# Patient Record
Sex: MALE | Race: WHITE | NOT HISPANIC OR LATINO | URBAN - METROPOLITAN AREA
[De-identification: names, ages, dates, MRNs, and addresses within clinical notes are randomized per-mention and may not be internally consistent; named-entity substitution may affect disease eponyms.]

---

## 2021-01-27 ENCOUNTER — INPATIENT (INPATIENT)
Facility: HOSPITAL | Age: 81
LOS: 12 days | Discharge: HOSPICE | DRG: 177 | End: 2021-02-09
Attending: HOSPITALIST | Admitting: STUDENT IN AN ORGANIZED HEALTH CARE EDUCATION/TRAINING PROGRAM
Payer: MEDICARE

## 2021-01-27 VITALS
TEMPERATURE: 98 F | OXYGEN SATURATION: 96 % | DIASTOLIC BLOOD PRESSURE: 76 MMHG | HEART RATE: 64 BPM | RESPIRATION RATE: 17 BRPM | SYSTOLIC BLOOD PRESSURE: 131 MMHG

## 2021-01-27 LAB
ALBUMIN SERPL ELPH-MCNC: 3.4 G/DL — SIGNIFICANT CHANGE UP (ref 3.3–5)
ALP SERPL-CCNC: 67 U/L — SIGNIFICANT CHANGE UP (ref 40–120)
ALT FLD-CCNC: 20 U/L — SIGNIFICANT CHANGE UP (ref 10–45)
ANION GAP SERPL CALC-SCNC: 12 MMOL/L — SIGNIFICANT CHANGE UP (ref 5–17)
ANISOCYTOSIS BLD QL: SLIGHT — SIGNIFICANT CHANGE UP
AST SERPL-CCNC: 25 U/L — SIGNIFICANT CHANGE UP (ref 10–40)
BASOPHILS # BLD AUTO: 0 K/UL — SIGNIFICANT CHANGE UP (ref 0–0.2)
BASOPHILS NFR BLD AUTO: 0 % — SIGNIFICANT CHANGE UP (ref 0–2)
BILIRUB SERPL-MCNC: 0.3 MG/DL — SIGNIFICANT CHANGE UP (ref 0.2–1.2)
BUN SERPL-MCNC: 22 MG/DL — SIGNIFICANT CHANGE UP (ref 7–23)
BURR CELLS BLD QL SMEAR: PRESENT — SIGNIFICANT CHANGE UP
CALCIUM SERPL-MCNC: 8.6 MG/DL — SIGNIFICANT CHANGE UP (ref 8.4–10.5)
CHLORIDE SERPL-SCNC: 103 MMOL/L — SIGNIFICANT CHANGE UP (ref 96–108)
CO2 SERPL-SCNC: 27 MMOL/L — SIGNIFICANT CHANGE UP (ref 22–31)
CREAT SERPL-MCNC: 0.72 MG/DL — SIGNIFICANT CHANGE UP (ref 0.5–1.3)
EOSINOPHIL # BLD AUTO: 0 K/UL — SIGNIFICANT CHANGE UP (ref 0–0.5)
EOSINOPHIL NFR BLD AUTO: 0 % — SIGNIFICANT CHANGE UP (ref 0–6)
GIANT PLATELETS BLD QL SMEAR: PRESENT — SIGNIFICANT CHANGE UP
GLUCOSE SERPL-MCNC: 103 MG/DL — HIGH (ref 70–99)
HCT VFR BLD CALC: 41.4 % — SIGNIFICANT CHANGE UP (ref 39–50)
HGB BLD-MCNC: 13.2 G/DL — SIGNIFICANT CHANGE UP (ref 13–17)
LYMPHOCYTES # BLD AUTO: 0.2 K/UL — LOW (ref 1–3.3)
LYMPHOCYTES # BLD AUTO: 4.4 % — LOW (ref 13–44)
MANUAL SMEAR VERIFICATION: SIGNIFICANT CHANGE UP
MCHC RBC-ENTMCNC: 28.9 PG — SIGNIFICANT CHANGE UP (ref 27–34)
MCHC RBC-ENTMCNC: 31.9 GM/DL — LOW (ref 32–36)
MCV RBC AUTO: 90.6 FL — SIGNIFICANT CHANGE UP (ref 80–100)
METAMYELOCYTES # FLD: 1.8 % — HIGH (ref 0–0)
MONOCYTES # BLD AUTO: 0.78 K/UL — SIGNIFICANT CHANGE UP (ref 0–0.9)
MONOCYTES NFR BLD AUTO: 17.5 % — HIGH (ref 2–14)
NEUTROPHILS # BLD AUTO: 3.12 K/UL — SIGNIFICANT CHANGE UP (ref 1.8–7.4)
NEUTROPHILS NFR BLD AUTO: 65.8 % — SIGNIFICANT CHANGE UP (ref 43–77)
NEUTS BAND # BLD: 4.4 % — SIGNIFICANT CHANGE UP (ref 0–8)
OVALOCYTES BLD QL SMEAR: SIGNIFICANT CHANGE UP
PLAT MORPH BLD: ABNORMAL
PLATELET # BLD AUTO: 120 K/UL — LOW (ref 150–400)
POIKILOCYTOSIS BLD QL AUTO: SLIGHT — SIGNIFICANT CHANGE UP
POTASSIUM SERPL-MCNC: 3.8 MMOL/L — SIGNIFICANT CHANGE UP (ref 3.5–5.3)
POTASSIUM SERPL-SCNC: 3.8 MMOL/L — SIGNIFICANT CHANGE UP (ref 3.5–5.3)
PROT SERPL-MCNC: 6.3 G/DL — SIGNIFICANT CHANGE UP (ref 6–8.3)
RBC # BLD: 4.57 M/UL — SIGNIFICANT CHANGE UP (ref 4.2–5.8)
RBC # FLD: 14.3 % — SIGNIFICANT CHANGE UP (ref 10.3–14.5)
RBC BLD AUTO: ABNORMAL
SARS-COV-2 RNA SPEC QL NAA+PROBE: DETECTED
SMUDGE CELLS # BLD: PRESENT — SIGNIFICANT CHANGE UP
SODIUM SERPL-SCNC: 142 MMOL/L — SIGNIFICANT CHANGE UP (ref 135–145)
VARIANT LYMPHS # BLD: 6.1 % — HIGH (ref 0–6)
WBC # BLD: 4.45 K/UL — SIGNIFICANT CHANGE UP (ref 3.8–10.5)
WBC # FLD AUTO: 4.45 K/UL — SIGNIFICANT CHANGE UP (ref 3.8–10.5)

## 2021-01-27 PROCEDURE — 71260 CT THORAX DX C+: CPT | Mod: 26,MG

## 2021-01-27 PROCEDURE — 99223 1ST HOSP IP/OBS HIGH 75: CPT | Mod: GC

## 2021-01-27 PROCEDURE — 74177 CT ABD & PELVIS W/CONTRAST: CPT | Mod: 26,ME

## 2021-01-27 PROCEDURE — G1004: CPT

## 2021-01-27 PROCEDURE — 99285 EMERGENCY DEPT VISIT HI MDM: CPT | Mod: CS

## 2021-01-27 PROCEDURE — 70450 CT HEAD/BRAIN W/O DYE: CPT | Mod: 26,MA

## 2021-01-27 PROCEDURE — 71045 X-RAY EXAM CHEST 1 VIEW: CPT | Mod: 26

## 2021-01-27 RX ORDER — CEFTRIAXONE 500 MG/1
1000 INJECTION, POWDER, FOR SOLUTION INTRAMUSCULAR; INTRAVENOUS ONCE
Refills: 0 | Status: COMPLETED | OUTPATIENT
Start: 2021-01-27 | End: 2021-01-27

## 2021-01-27 RX ORDER — HALOPERIDOL DECANOATE 100 MG/ML
2 INJECTION INTRAMUSCULAR ONCE
Refills: 0 | Status: DISCONTINUED | OUTPATIENT
Start: 2021-01-27 | End: 2021-01-27

## 2021-01-27 RX ORDER — VANCOMYCIN HCL 1 G
1500 VIAL (EA) INTRAVENOUS ONCE
Refills: 0 | Status: COMPLETED | OUTPATIENT
Start: 2021-01-27 | End: 2021-01-27

## 2021-01-27 RX ORDER — SODIUM CHLORIDE 9 MG/ML
500 INJECTION INTRAMUSCULAR; INTRAVENOUS; SUBCUTANEOUS ONCE
Refills: 0 | Status: COMPLETED | OUTPATIENT
Start: 2021-01-27 | End: 2021-01-27

## 2021-01-27 RX ORDER — GLYCERIN ADULT
2 SUPPOSITORY, RECTAL RECTAL ONCE
Refills: 0 | Status: COMPLETED | OUTPATIENT
Start: 2021-01-27 | End: 2021-01-27

## 2021-01-27 RX ORDER — METRONIDAZOLE 500 MG
500 TABLET ORAL ONCE
Refills: 0 | Status: COMPLETED | OUTPATIENT
Start: 2021-01-27 | End: 2021-01-27

## 2021-01-27 RX ORDER — HALOPERIDOL DECANOATE 100 MG/ML
2 INJECTION INTRAMUSCULAR ONCE
Refills: 0 | Status: COMPLETED | OUTPATIENT
Start: 2021-01-27 | End: 2021-01-27

## 2021-01-27 RX ADMIN — Medication 1 MILLIGRAM(S): at 17:05

## 2021-01-27 RX ADMIN — Medication 100 MILLIGRAM(S): at 19:45

## 2021-01-27 RX ADMIN — Medication 1 ENEMA: at 20:52

## 2021-01-27 RX ADMIN — Medication 300 MILLIGRAM(S): at 20:53

## 2021-01-27 RX ADMIN — CEFTRIAXONE 100 MILLIGRAM(S): 500 INJECTION, POWDER, FOR SOLUTION INTRAMUSCULAR; INTRAVENOUS at 19:17

## 2021-01-27 RX ADMIN — Medication 2 SUPPOSITORY(S): at 20:53

## 2021-01-27 RX ADMIN — SODIUM CHLORIDE 1000 MILLILITER(S): 9 INJECTION INTRAMUSCULAR; INTRAVENOUS; SUBCUTANEOUS at 14:30

## 2021-01-27 NOTE — H&P ADULT - HISTORY OF PRESENT ILLNESS
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 Per aide upon arrival to ED, the patient has been groaning more frequently and points to his abdomen. Last BM was on 1/26th and no blood noted in BMs.     ED Vitals: T 98.1F, /76, HR 64, RR 17, SpO2 96% RA  ED Labs: WBC 4.45, Hb 13.2, plt 120, Na 142, K+ 3.8, BUN 22, sCr 0.72, glucose 103, COVID-19 PCR positive   ED Imaging: CXR shwoing L basilar opacity. CTH noncontrast: no acute pathologies, generalized volume loss. CT chest/abd/pelvis: +Bibasilar lower lobe consolidations likely aspiration PNA, subtle GGO in RML. +Large rectal stool burden with probable stercoral proctitis  ED Management: IV CTX 1g x1, IV flagyl 500mg x1, IV vanc 1500mg x1. 500cc NS bolus x1. IM haldol 2mg x1, IV ativan 1mg x1. Fleet enema x1. Manual disimpaction by surgery team

## 2021-01-27 NOTE — ED PROVIDER NOTE - OBJECTIVE STATEMENT
79 yo hx of dementia w upper  baack pain and abd pain for the last week. hx per aide, but limited to that information. no known vomitng, GI bleed sx, f/c. pt non verbal. states mental status at  baseline. DNR/DNI per paperwork. 79 yo hx of dementia w upper back pain and abd pain for the last week. aide states pt has been groaning more often, pointing to abdomen. hx per aide, but limited to that information. no known vomiting, GI bleed sx, f/c. pt non verbal. states mental status at baseline. DNR/DNI per paperwork. has not had BM in a few days per aide.

## 2021-01-27 NOTE — ED ADULT NURSE NOTE - CHIEF COMPLAINT QUOTE
hollis from [address] [place] [address].  Per ems patient was c/o abdominal pain this morning and upper back pain from fall last week.  Hx dementia.  Patient is nonverbal at baseline per HHA.  Bruising to back.

## 2021-01-27 NOTE — H&P ADULT - NSHPSOCIALHISTORY_GEN_ALL_CORE
Patient lives at nursing home. Patient lives at nursing home. Unable to assess further due to patient's cognitive impairment

## 2021-01-27 NOTE — ED PROVIDER NOTE - CLINICAL SUMMARY MEDICAL DECISION MAKING FREE TEXT BOX
Pt w dementia wupper back pain and abd pain  - given limited hx, labs, CT, reassess. Pt w dementia w upper back pain and abd pain, given limited hx and complaints, labs, CT c/a/p completed. unclear if mental status change ?chronic/acute- head CT added. +infiltrates concerning for aspiration, stercoral proctitis, broad spectrum abx initiated, surg consulted w covid positive later resulted. Disc with assisted living facility, due to covid +, unable to take pt back.  glycerin suppository/enema given for constipation w result of 2 medium stools reported by nurse. Pt w dementia w upper back pain and abd pain, given limited hx and complaints, labs, CT c/a/p completed. unclear if mental status change ?chronic/acute- head CT added.  pt not tolerating initial CTs,  becoming agitated on table, sedatives given and rpt CT obtained. +infiltrates concerning for aspiration, stercoral proctitis, broad spectrum abx initiated, surg consulted w covid positive later resulted. Disc with assisted living facility, due to covid +, unable to take pt back.  glycerin suppository/enema given for constipation w result of 2 medium stools reported by nurse.

## 2021-01-27 NOTE — H&P ADULT - PROBLEM SELECTOR PLAN 1
Pt presents with 1 week of abdominal and lower back pain. Found to have stool impaction, s/p disimpaction by surgery in the ED. No acute surgical intervention and no abx recommended by surgery at this time.   - f/u surgery recs  - c/w bowel regimen with miralax and senna  - s/p IV CTX 1g x1, IV flagyl 500mg x1, IV vanc 1500mg x1 in the ED  - pt afebrile, no leukocytosis and no signs of active infection  - will hold off on further antibiotics at this time

## 2021-01-27 NOTE — H&P ADULT - PROBLEM SELECTOR PLAN 2
Pt incidentally found to have COVID-19 PCR+ on presentation. No need for supplemental oxygen at this time. CT chest showing subtle GGO in RML on CT chest.   - trend daily CRP, ferritin, d-dimer  - DVT ppx with lovenox  - pending ROSAS

## 2021-01-27 NOTE — ED ADULT NURSE NOTE - OBJECTIVE STATEMENT
Patent BIBA from assisted living for intermitent abdominal pain for 1 week , Patent  confused BIBA from assisted living for intermitent abdominal pain for 1 week , s/p fall 1 week ago , HHA noticed bruise on Left mid back . Unknown loc . Pt. had poor appetite today but no pain at this time , no nausea , vomiting , fever nor chills . Safety maintained ./

## 2021-01-27 NOTE — ED ADULT NURSE NOTE - PMH
Alzheimer disease    Bipolar 1 disorder    Dementia    Insomnia    Schizophrenia, acute undifferentiated

## 2021-01-27 NOTE — ED ADULT TRIAGE NOTE - CHIEF COMPLAINT QUOTE
hollis from [address] [place] [address].  Per ems patient was c/o abdominal pain this morning and upper back pain from fall last week.  Hx dementia.  Patient is nonverbal at baseline per HHA.  Bruising to back.

## 2021-01-27 NOTE — H&P ADULT - PROBLEM SELECTOR PLAN 4
Hx of bipolar and schizophrenia. Takes seroquel 25mg prn   - s/p IM haldol 2mg x1, IV ativan 1mg x1  - seroquel 25mg prn for agitation  - f/u AM official med/rec  - enhanced supervision

## 2021-01-27 NOTE — H&P ADULT - NSHPLABSRESULTS_GEN_ALL_CORE
13.2   4.45  )-----------( 120      ( 27 Jan 2021 14:31 )             41.4     01-27    142  |  103  |  22  ----------------------------<  103<H>  3.8   |  27  |  0.72    Ca    8.6      27 Jan 2021 14:31    TPro  6.3  /  Alb  3.4  /  TBili  0.3  /  DBili  x   /  AST  25  /  ALT  20  /  AlkPhos  67  01-27          RADIOLOGY & ADDITIONAL TESTS:

## 2021-01-27 NOTE — H&P ADULT - PROBLEM SELECTOR PLAN 8
Plan:   F: none  E: replete K<4, Mg<2  N: Regular diet  VTE Prophylaxis: lovenox  C: DNR/DNI  D: RMF

## 2021-01-27 NOTE — CONSULT NOTE ADULT - ATTENDING COMMENTS
No complaints, but not particularly oriented.  Abd soft, ND NT  AARON no masses, some solid stool in vault. generalized tenderness    Imp: Fecal impaction.  Stercoral proctitis is minimal and not a clinical issue.  Rec: Bowel regimen. May need enema if tolerates it. No complaints, but not particularly oriented.  Abd soft, ND NT  AARON no masses, some solid stool in vault. generalized tenderness    Imp: Fecal impaction.  Stercoral proctitis is minimal and not a clinical issue.  Rec: Bowel regimen. May need enema if tolerates it.    Reconsult prn

## 2021-01-27 NOTE — ED PROVIDER NOTE - CARE PLAN
Principal Discharge DX:	Abdominal pain   Principal Discharge DX:	Constipation  Secondary Diagnosis:	Proctitis  Secondary Diagnosis:	COVID-19  Secondary Diagnosis:	Aspiration pneumonia

## 2021-01-27 NOTE — H&P ADULT - ASSESSMENT
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 Also found to be COVID+

## 2021-01-27 NOTE — ED PROVIDER NOTE - NS ED MD DISPO SPECIAL CONSIDERATION1
Denies bleeding issues.     INR (no units)   Date Value   10/31/2019 2.2   10/07/2019 1.4     INR-POC (no units)   Date Value   10/31/2019 2.2          Confirmed COVID-19

## 2021-01-27 NOTE — CONSULT NOTE ADULT - SUBJECTIVE AND OBJECTIVE BOX
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 No reports of blood in BM, and per nursing staff, his last BM was on the 26th.     General surgery consulted for evaluation of stercoral colitis as seen on CT scan.      PAST MEDICAL & SURGICAL HISTORY:  Schizophrenia, acute undifferentiated    Bipolar 1 disorder    Insomnia    Dementia    Alzheimer disease    No significant past surgical history      Home Meds: Home Medications:  Melatonin 3 mg oral tablet: 1 tab(s) orally once a day (at bedtime) (27 Jan 2021 12:58)  SEROquel 25 mg oral tablet: 1 tab(s) orally once a day, As Needed (27 Jan 2021 12:58)    Allergies: Allergies    No Known Allergies    Intolerances      Soc:   Advanced Directives: Presumed Full Code     CURRENT MEDICATIONS:   --------------------------------------------------------------------------------------  Neurologic Medications    Respiratory Medications    Cardiovascular Medications    Gastrointestinal Medications  glycerin Suppository - Adult 2 Suppository(s) Rectal Once  saline laxative (FLEET) Rectal Enema 1 Enema Rectal Once    Genitourinary Medications    Hematologic/Oncologic Medications    Antimicrobial/Immunologic Medications  vancomycin  IVPB. 1500 milliGRAM(s) IV Intermittent once    Endocrine/Metabolic Medications    Topical/Other Medications    --------------------------------------------------------------------------------------    VITAL SIGNS, INS/OUTS (last 24 hours):  --------------------------------------------------------------------------------------  ICU Vital Signs Last 24 Hrs  T(C): 37.1 (27 Jan 2021 17:16), Max: 37.1 (27 Jan 2021 17:16)  T(F): 98.8 (27 Jan 2021 17:16), Max: 98.8 (27 Jan 2021 17:16)  HR: 74 (27 Jan 2021 17:16) (64 - 74)  BP: 137/76 (27 Jan 2021 17:16) (131/76 - 137/76)  BP(mean): --  ABP: --  ABP(mean): --  RR: 17 (27 Jan 2021 17:16) (17 - 17)  SpO2: 96% (27 Jan 2021 17:16) (96% - 96%)    I&O's Summary    --------------------------------------------------------------------------------------    EXAM:  General: NAD, combative  Neuro: demented, doesn't answer questions  HEENT: NC/AT, EOMI, MMM  Resp: non-labored breathing on RA  CV: palpable distal pulses  Abdomen: soft, non-distended, non-tender  Rectal: no external hemorrhoids, hard stool evacuated from rectal vault, no blood seen  Extremities: warm, non-edematous, ROM intact        LABS  --------------------------------------------------------------------------------------  Labs:  CAPILLARY BLOOD GLUCOSE                              13.2   4.45  )-----------( 120      ( 27 Jan 2021 14:31 )             41.4       Auto Neutrophil %: 65.8 % (01-27-21 @ 14:31)  Band Neutrophils %: 4.4 % (01-27-21 @ 14:31)    01-27    142  |  103  |  22  ----------------------------<  103<H>  3.8   |  27  |  0.72      Calcium, Total Serum: 8.6 mg/dL (01-27-21 @ 14:31)      LFTs:             6.3  | 0.3  | 25       ------------------[67      ( 27 Jan 2021 14:31 )  3.4  | x    | 20          Lipase:x      Amylase:x             Coags:                  --------------------------------------------------------------------------------------    OTHER LABS    IMAGING RESULTS  EXAM: CT CHEST IC    EXAM: CT ABDOMEN AND PELVIS IC    PROCEDURE DATE: 01/27/2021        INTERPRETATION: CLINICAL INFORMATION: Abdominal pain, shortness of breath    COMPARISON: None.    PROCEDURE:    CT of the Chest, abdomen and pelvis was performed with intravenous contrast.  90 ml of Omnipaque 350 was injected intravenously. 10 ml were discarded. No oral contrast was given.  Sagittal and coronal reformats were performed, as well as maximum intensity projections (MIPs) of the chest.      FINDINGS:  Motion degraded study.    Lungs and airways: Bilateral posterior basal and lateral basal segment consolidation, left greater than the right. Subtle groundglass opacity in the lateral right middle lobe (5:94). Central airways are patent.  Pleura: No pleural effusion  Mediastinum And Vidhya: Patulous intrathoracic esophagus without air-fluid level. Few small mediastinal and right hilar nodes, likely reactive.  Heart: Within normal limits  Vessels: Within normal limits  Chest wall: Within normal limits.    Liver: Normal in size and morphology. 2.6 cm hypodensity in the right hepatic lobe likely hepatic cyst. Faint hypodensity in the left hepatic lobe (3:75 and hepatic segment 6 (3:85), too small to characterize. The main portal vein is patent.  Gallbladder and biliary ducts: No gallstones. No intra/extrahepatic biliary ductal dilatation.  Spleen: Within normal limits.  Pancreas: Fatty atrophy of pancreatic head. No ductal dilatation..  Adrenals: Within normal limits.  Kidneys/ureters: No hydronephrosis. No urinary calculi. Cortical scarring in the posterior aspect of the right kidney.    Bladder: Within normal limits.  Reproductive organs: Status post prostatectomy, without definite pelvic mass.    Bowel: Large stool burden in the rectum with mild rectal wall thickening and presacral fat stranding. Scattered colonic diverticulosis.  Peritoneum/retroperitoneum: Focal peritoneal fat stranding in the right upper abdomen adjacent to the hepatic flexure (3:102), of unclear etiology. Postsurgical clips in the pelvis.  Vasculature: The aorta and its branches are normal in caliber.  Lymph nodes: Lymph nodes are not enlarged.    Bones and soft tissue: Degenerative changes in the spine..      IMPRESSION:    Motion degraded study.  1. Bilateral lower lobe consolidations, likely aspiration pneumonia.  2. Subtle area of groundglass opacity in the right middle lobe, likely infectious/inflammatory.  3. Subtle focal fat stranding in the right upper quadrant adjacent to hepatic flexure, may be artifactual versus adjacent small diverticulitis. However evaluation is limited.  4. Large rectal stool burden with probable stercoral proctitis.          ASSESSMENT/ PLAN:    Patient is a 81 y/o M with dementia with Lewy bodies, and no known PSH (patient doesn't answer questions), presents from his nursing home to the ED with reported abdominal and back pain for 1 week. No reports of blood in BM, and per nursing staff, his last BM was on the 26th. He is afebrile and HDS, with benign abdominal exam, and hard stool evacuated from rectal vault, with no blood seen. CT findings suggestive of stercoral colitis. He is COVID positive.    - No surgical intervention  - Continue bowel regimen  - No need for abx from our perspective  - Please reconsult as needed      Attending aware and agrees with plan

## 2021-01-27 NOTE — H&P ADULT - PROBLEM SELECTOR PLAN 6
Hx of dementia with lewy bodies. Nonverbal at baseline. Not on any other known medications for dementia  - f/u AM official med/rec  - enhanced supervision

## 2021-01-27 NOTE — H&P ADULT - ATTENDING COMMENTS
Seen by General Surgery and disimpacted in the ED.     [ ] Stercoral Colitis  -	C/w Bowel Regimen    -	No indication for antibiotics     [ ] C/f Aspiration PNA  -	Consolidation on CT c/f aspiration. No WBC elevation, no fevers. Will defer Abx therapy at present. Should he manifest infectious signs, start               Unasyn.   -	Speech&Swallow    [ ] Protein-Calorie Malnutrition – Nutrition Consult     [ ] DNR/DNI – Consider Palliative Care Consult     [ ] Agitation – Only on PRN Seroquel. Currently req enhanced supervision. Consider Psych Consult for assistance with agitation management.     - PT Eval (From Nursing Home) – will likely need to be moved to [facility] when medical issues resolved for repeat COVID swab.

## 2021-01-27 NOTE — ED ADULT NURSE REASSESSMENT NOTE - NS ED NURSE REASSESS COMMENT FT1
As per MD Cervantes no blood cultures needed at this time. Pt tolerated supposity and fleet enema well. Aid educated, understanding and accepting. 1:1 provided when aid leaves. Pt cleaned, boosted, small liquid bowel movement x1.

## 2021-01-27 NOTE — ED PROVIDER NOTE - PHYSICAL EXAMINATION
CONSTITUTIONAL: Awake, alert and in no apparent distress.  HEENT: Head is atraumatic. Eyes clear bilaterally, normal EOMI. Airway patent.  CARDIAC: Normal rate, regular rhythm.  Heart sounds S1, S2.   RESPIRATORY: Breath sounds clear and equal bilaterally. no tachypnea, respiratory distress.   GASTROINTESTINAL: Abdomen soft, non-tender, no guarding, distension.  MUSCULOSKELETAL: Spine appears normal, no midline spinal tenderness, range of motion is not limited, no muscle or joint tenderness. no bony tenderness.   NEUROLOGICAL: Alert, no focal deficits, no motor or sensory deficits.  SKIN: Skin normal color for race, warm, dry and intact. No evidence of rash.  PSYCHIATRIC: Normal mood and affect. no apparent risk to self or others. CONSTITUTIONAL: Awake, alert and in no apparent distress.  HEENT: Head is atraumatic. Eyes clear bilaterally, normal EOMI. Airway patent.  CARDIAC: Normal rate, regular rhythm.  Heart sounds S1, S2.   RESPIRATORY: Breath sounds clear and equal bilaterally. no tachypnea, respiratory distress.   GASTROINTESTINAL: Abdomen soft, guarding when palpated, distension.  MUSCULOSKELETAL: Spine appears normal, no midline spinal tenderness, range of motion is not limited, no muscle or joint tenderness. no bony tenderness.   NEUROLOGICAL: Alert, no focal deficits, no motor or sensory deficits.  SKIN: Skin normal color for race, warm, dry and intact. No evidence of rash.  PSYCHIATRIC: Normal mood and affect. no apparent risk to self or others.  rEctal: no GI bleeding, brown stool at tip of finger

## 2021-01-27 NOTE — H&P ADULT - PROBLEM SELECTOR PLAN 3
Pt found to have +Bibasilar lower lobe consolidations likely aspiration PNA, subtle GGO in RML on CT chest. Pt afebrile, no leukocytosis and not septic  - aspiration precautions  - hold off on antibiotics at this time

## 2021-01-27 NOTE — H&P ADULT - NSICDXPASTMEDICALHX_GEN_ALL_CORE_FT
PAST MEDICAL HISTORY:  Alzheimer disease     Bipolar 1 disorder     Dementia     Insomnia     Schizophrenia, acute undifferentiated

## 2021-01-28 DIAGNOSIS — U07.1 COVID-19: ICD-10-CM

## 2021-01-28 DIAGNOSIS — R63.8 OTHER SYMPTOMS AND SIGNS CONCERNING FOOD AND FLUID INTAKE: ICD-10-CM

## 2021-01-28 DIAGNOSIS — Z51.5 ENCOUNTER FOR PALLIATIVE CARE: ICD-10-CM

## 2021-01-28 DIAGNOSIS — G31.83 NEUROCOGNITIVE DISORDER WITH LEWY BODIES: ICD-10-CM

## 2021-01-28 DIAGNOSIS — K62.89 OTHER SPECIFIED DISEASES OF ANUS AND RECTUM: ICD-10-CM

## 2021-01-28 DIAGNOSIS — F31.9 BIPOLAR DISORDER, UNSPECIFIED: ICD-10-CM

## 2021-01-28 DIAGNOSIS — F03.90 UNSPECIFIED DEMENTIA, UNSPECIFIED SEVERITY, WITHOUT BEHAVIORAL DISTURBANCE, PSYCHOTIC DISTURBANCE, MOOD DISTURBANCE, AND ANXIETY: ICD-10-CM

## 2021-01-28 DIAGNOSIS — Z71.89 OTHER SPECIFIED COUNSELING: ICD-10-CM

## 2021-01-28 DIAGNOSIS — Z66 DO NOT RESUSCITATE: ICD-10-CM

## 2021-01-28 DIAGNOSIS — E43 UNSPECIFIED SEVERE PROTEIN-CALORIE MALNUTRITION: ICD-10-CM

## 2021-01-28 DIAGNOSIS — G47.00 INSOMNIA, UNSPECIFIED: ICD-10-CM

## 2021-01-28 DIAGNOSIS — J69.0 PNEUMONITIS DUE TO INHALATION OF FOOD AND VOMIT: ICD-10-CM

## 2021-01-28 DIAGNOSIS — K59.00 CONSTIPATION, UNSPECIFIED: ICD-10-CM

## 2021-01-28 LAB
ALBUMIN SERPL ELPH-MCNC: 2.8 G/DL — LOW (ref 3.3–5)
ALP SERPL-CCNC: 54 U/L — SIGNIFICANT CHANGE UP (ref 40–120)
ALT FLD-CCNC: 15 U/L — SIGNIFICANT CHANGE UP (ref 10–45)
ANION GAP SERPL CALC-SCNC: 12 MMOL/L — SIGNIFICANT CHANGE UP (ref 5–17)
APPEARANCE UR: CLEAR — SIGNIFICANT CHANGE UP
AST SERPL-CCNC: 23 U/L — SIGNIFICANT CHANGE UP (ref 10–40)
BACTERIA # UR AUTO: SIGNIFICANT CHANGE UP /HPF
BILIRUB SERPL-MCNC: 0.3 MG/DL — SIGNIFICANT CHANGE UP (ref 0.2–1.2)
BILIRUB UR-MCNC: NEGATIVE — SIGNIFICANT CHANGE UP
BUN SERPL-MCNC: 17 MG/DL — SIGNIFICANT CHANGE UP (ref 7–23)
CALCIUM SERPL-MCNC: 8.5 MG/DL — SIGNIFICANT CHANGE UP (ref 8.4–10.5)
CHLORIDE SERPL-SCNC: 104 MMOL/L — SIGNIFICANT CHANGE UP (ref 96–108)
CO2 SERPL-SCNC: 26 MMOL/L — SIGNIFICANT CHANGE UP (ref 22–31)
COLOR SPEC: YELLOW — SIGNIFICANT CHANGE UP
COMMENT - URINE: SIGNIFICANT CHANGE UP
CREAT SERPL-MCNC: 0.73 MG/DL — SIGNIFICANT CHANGE UP (ref 0.5–1.3)
CRP SERPL-MCNC: 8.64 MG/DL — HIGH (ref 0–0.4)
D DIMER BLD IA.RAPID-MCNC: 721 NG/ML DDU — HIGH
DIFF PNL FLD: NEGATIVE — SIGNIFICANT CHANGE UP
EPI CELLS # UR: SIGNIFICANT CHANGE UP /HPF (ref 0–5)
FERRITIN SERPL-MCNC: 409 NG/ML — HIGH (ref 30–400)
GLUCOSE SERPL-MCNC: 90 MG/DL — SIGNIFICANT CHANGE UP (ref 70–99)
GLUCOSE UR QL: NEGATIVE — SIGNIFICANT CHANGE UP
HCT VFR BLD CALC: 37.9 % — LOW (ref 39–50)
HGB BLD-MCNC: 12.3 G/DL — LOW (ref 13–17)
KETONES UR-MCNC: 15 MG/DL
LEUKOCYTE ESTERASE UR-ACNC: NEGATIVE — SIGNIFICANT CHANGE UP
MAGNESIUM SERPL-MCNC: 2 MG/DL — SIGNIFICANT CHANGE UP (ref 1.6–2.6)
MCHC RBC-ENTMCNC: 29.2 PG — SIGNIFICANT CHANGE UP (ref 27–34)
MCHC RBC-ENTMCNC: 32.5 GM/DL — SIGNIFICANT CHANGE UP (ref 32–36)
MCV RBC AUTO: 90 FL — SIGNIFICANT CHANGE UP (ref 80–100)
NITRITE UR-MCNC: NEGATIVE — SIGNIFICANT CHANGE UP
NRBC # BLD: 0 /100 WBCS — SIGNIFICANT CHANGE UP (ref 0–0)
PH UR: 6 — SIGNIFICANT CHANGE UP (ref 5–8)
PHOSPHATE SERPL-MCNC: 3.2 MG/DL — SIGNIFICANT CHANGE UP (ref 2.5–4.5)
PLATELET # BLD AUTO: 107 K/UL — LOW (ref 150–400)
POTASSIUM SERPL-MCNC: 3.5 MMOL/L — SIGNIFICANT CHANGE UP (ref 3.5–5.3)
POTASSIUM SERPL-SCNC: 3.5 MMOL/L — SIGNIFICANT CHANGE UP (ref 3.5–5.3)
PROT SERPL-MCNC: 5.6 G/DL — LOW (ref 6–8.3)
PROT UR-MCNC: ABNORMAL MG/DL
RBC # BLD: 4.21 M/UL — SIGNIFICANT CHANGE UP (ref 4.2–5.8)
RBC # FLD: 14.3 % — SIGNIFICANT CHANGE UP (ref 10.3–14.5)
RBC CASTS # UR COMP ASSIST: < 5 /HPF — SIGNIFICANT CHANGE UP
SODIUM SERPL-SCNC: 142 MMOL/L — SIGNIFICANT CHANGE UP (ref 135–145)
SP GR SPEC: 1.02 — SIGNIFICANT CHANGE UP (ref 1–1.03)
UROBILINOGEN FLD QL: 1 E.U./DL — SIGNIFICANT CHANGE UP
WBC # BLD: 4.06 K/UL — SIGNIFICANT CHANGE UP (ref 3.8–10.5)
WBC # FLD AUTO: 4.06 K/UL — SIGNIFICANT CHANGE UP (ref 3.8–10.5)
WBC UR QL: < 5 /HPF — SIGNIFICANT CHANGE UP

## 2021-01-28 PROCEDURE — 99223 1ST HOSP IP/OBS HIGH 75: CPT | Mod: CS

## 2021-01-28 PROCEDURE — 99233 SBSQ HOSP IP/OBS HIGH 50: CPT | Mod: GC

## 2021-01-28 RX ORDER — QUETIAPINE FUMARATE 200 MG/1
25 TABLET, FILM COATED ORAL AT BEDTIME
Refills: 0 | Status: DISCONTINUED | OUTPATIENT
Start: 2021-01-28 | End: 2021-02-01

## 2021-01-28 RX ORDER — SENNA PLUS 8.6 MG/1
2 TABLET ORAL AT BEDTIME
Refills: 0 | Status: DISCONTINUED | OUTPATIENT
Start: 2021-01-28 | End: 2021-02-05

## 2021-01-28 RX ORDER — POLYETHYLENE GLYCOL 3350 17 G/17G
17 POWDER, FOR SOLUTION ORAL DAILY
Refills: 0 | Status: DISCONTINUED | OUTPATIENT
Start: 2021-01-29 | End: 2021-01-30

## 2021-01-28 RX ORDER — ENOXAPARIN SODIUM 100 MG/ML
40 INJECTION SUBCUTANEOUS EVERY 24 HOURS
Refills: 0 | Status: DISCONTINUED | OUTPATIENT
Start: 2021-01-28 | End: 2021-01-29

## 2021-01-28 RX ORDER — POLYETHYLENE GLYCOL 3350 17 G/17G
17 POWDER, FOR SOLUTION ORAL DAILY
Refills: 0 | Status: DISCONTINUED | OUTPATIENT
Start: 2021-01-28 | End: 2021-01-28

## 2021-01-28 RX ORDER — LANOLIN ALCOHOL/MO/W.PET/CERES
3 CREAM (GRAM) TOPICAL AT BEDTIME
Refills: 0 | Status: DISCONTINUED | OUTPATIENT
Start: 2021-01-28 | End: 2021-02-09

## 2021-01-28 RX ADMIN — ENOXAPARIN SODIUM 40 MILLIGRAM(S): 100 INJECTION SUBCUTANEOUS at 02:19

## 2021-01-28 NOTE — CONSULT NOTE ADULT - PROBLEM SELECTOR RECOMMENDATION 6
-wife, Ann, very involved in 's care. Work #: [phone]  -will try to reach her again tomorrow  -confirmed MOLST with GA MILLER

## 2021-01-28 NOTE — PROGRESS NOTE ADULT - PROBLEM SELECTOR PLAN 2
BMI 16.9. Eating very little inpatient. Reportedly was ambulating well at assisted living facility.  - Palliative consulted  - DNR/DNI  - Patient refusing to complete speech and swallow evaluation  - Refused PT BMI 16.9. Eating very little inpatient. Reportedly was ambulating well at assisted living facility.  - Palliative consulted  - DNR/DNI  - Patient refusing to complete speech and swallow evaluation; recommend thin liquids and mechanical soft diet  - Refused PT

## 2021-01-28 NOTE — CONSULT NOTE ADULT - SUBJECTIVE AND OBJECTIVE BOX
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 Per aide upon arrival to ED, the patient has been groaning more frequently and points to his abdomen. Last BM was on  and no blood noted in BMs.     ED Vitals: T 98.1F, /76, HR 64, RR 17, SpO2 96% RA  ED Labs: WBC 4.45, Hb 13.2, plt 120, Na 142, K+ 3.8, BUN 22, sCr 0.72, glucose 103, COVID-19 PCR positive   ED Imaging: CXR shwoing L basilar opacity. CTH noncontrast: no acute pathologies, generalized volume loss. CT chest/abd/pelvis: +Bibasilar lower lobe consolidations likely aspiration PNA, subtle GGO in RML. +Large rectal stool burden with probable stercoral proctitis  ED Management: IV CTX 1g x1, IV flagyl 500mg x1, IV vanc 1500mg x1. 500cc NS bolus x1. IM haldol 2mg x1, IV ativan 1mg x1. Fleet enema x1. Manual disimpaction by surgery team (2021 21:14)      PAST MEDICAL & SURGICAL HISTORY:  Schizophrenia, acute undifferentiated  Bipolar 1 disorder  Insomnia  Dementia  Alzheimer disease  No significant past surgical history    FAMILY HISTORY:   Reviewed and found non contributory in mother or father    Due to public health emergency during pandemic and requirements to limit consultant interaction, please refer to primary team note for physical exam, ROS, and comprehensive social history.  SOCIAL HISTORY:     All other ROS negative unless specified above.     Allergies  No Known Allergies  Intolerances    Opiate Naive (Y/N):   -iStop reviewed (Y/N): Yes.   No Rx found on iStop review (Ref#:           )    Medications:      MEDICATIONS  (STANDING):  enoxaparin Injectable 40 milliGRAM(s) SubCutaneous every 24 hours  melatonin 3 milliGRAM(s) Oral at bedtime  QUEtiapine 25 milliGRAM(s) Oral at bedtime  senna 2 Tablet(s) Oral at bedtime    MEDICATIONS  (PRN):      Labs:    CBC:                        12.3   4.06  )-----------( 107      ( 2021 07:28 )             37.9     CMP:        142  |  104  |  17  ----------------------------<  90  3.5   |  26  |  0.73    Ca    8.5      2021 07:28  Phos  3.2       Mg     2.0         TPro  5.6<L>  /  Alb  2.8<L>  /  TBili  0.3  /  DBili  x   /  AST  23  /  ALT  15  /  AlkPhos  54     Albumin, Serum: 2.8 g/dL (21 @ 07:28)       Urinalysis Basic - ( 2021 15:28 )    Color: Yellow / Appearance: Clear / S.025 / pH: x  Gluc: x / Ketone: 15 mg/dL  / Bili: Negative / Urobili: 1.0 E.U./dL   Blood: x / Protein: Trace mg/dL / Nitrite: NEGATIVE   Leuk Esterase: NEGATIVE / RBC: < 5 /HPF / WBC < 5 /HPF   Sq Epi: x / Non Sq Epi: 0-5 /HPF / Bacteria: Moderate /HPF    Imaging:  Reviewed    PEx:  T(C): 36.9 (21 @ 09:35), Max: 37.3 (21 @ 22:55)  HR: 59 (21 @ 15:10) (59 - 84)  BP: 117/67 (21 @ 09:35) (117/66 - 137/76)  RR: 18 (21 @ 15:10) (17 - 18)  SpO2: 95% (21 @ 15:10) (95% - 99%)  Wt(kg): --    General:   HEENT:  atraumatic, nonicteric sclera  Neck: no central lines, supple  CVS: s1,s2  Resp:   GI:    :    Musc:     Neuro:   Psych:     Skin:  Lymph:  Preadmit Karnofsky:  %             Current Karnofsky:     %  http://www.npcrc.org/files/news/karnofsky_performance_scale.pdf   http://www.npcrc.org/files/news/palliative_performance_scale_PPSv2.pdf      BMI:  Wt:  Cachexia (Y/N):     ADVANCE DIRECTIVES  -DNR/DNI    Decision maker: The patient is UNable to participate in complex medical decision making conversations.   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 Per aide upon arrival to ED, the patient has been groaning more frequently and points to his abdomen. Last BM was on  and no blood noted in BMs.     ED Vitals: T 98.1F, /76, HR 64, RR 17, SpO2 96% RA  ED Labs: WBC 4.45, Hb 13.2, plt 120, Na 142, K+ 3.8, BUN 22, sCr 0.72, glucose 103, COVID-19 PCR positive   ED Imaging: CXR shwoing L basilar opacity. CTH noncontrast: no acute pathologies, generalized volume loss. CT chest/abd/pelvis: +Bibasilar lower lobe consolidations likely aspiration PNA, subtle GGO in RML. +Large rectal stool burden with probable stercoral proctitis  ED Management: IV CTX 1g x1, IV flagyl 500mg x1, IV vanc 1500mg x1. 500cc NS bolus x1. IM haldol 2mg x1, IV ativan 1mg x1. Fleet enema x1. Manual disimpaction by surgery team (2021 21:14)    PAST MEDICAL & SURGICAL HISTORY:  Schizophrenia, acute undifferentiated  Bipolar 1 disorder  Insomnia  Dementia  Alzheimer disease  No significant past surgical history    FAMILY HISTORY:   Reviewed and found non contributory in mother or father    Due to public health emergency during pandemic and requirements to limit consultant interaction, please refer to primary team note for physical exam, ROS, and comprehensive social history.  SOCIAL HISTORY: resident of [address] [place] [address]. Wife, Ann, is practicing psychiatrist #[phone]    Allergies  No Known Allergies  Intolerances    Opiate Naive (Y/N): Y  -iStop reviewed (Y/N): Yes.   No Rx found on iStop review (Ref#:   [id number]    Medications:      MEDICATIONS  (STANDING):  enoxaparin Injectable 40 milliGRAM(s) SubCutaneous every 24 hours  melatonin 3 milliGRAM(s) Oral at bedtime  QUEtiapine 25 milliGRAM(s) Oral at bedtime  senna 2 Tablet(s) Oral at bedtime    MEDICATIONS  (PRN):  Labs:    CBC:                        12.3   4.06  )-----------( 107      ( 2021 07:28 )             37.9     CMP:        142  |  104  |  17  ----------------------------<  90  3.5   |  26  |  0.73    Ca    8.5      2021 07:28  Phos  3.2       Mg     2.0         TPro  5.6<L>  /  Alb  2.8<L>  /  TBili  0.3  /  DBili  x   /  AST  23  /  ALT  15  /  AlkPhos  54     Albumin, Serum: 2.8 g/dL (21 @ 07:28)       Urinalysis Basic - ( 2021 15:28 )    Color: Yellow / Appearance: Clear / S.025 / pH: x  Gluc: x / Ketone: 15 mg/dL  / Bili: Negative / Urobili: 1.0 E.U./dL   Blood: x / Protein: Trace mg/dL / Nitrite: NEGATIVE   Leuk Esterase: NEGATIVE / RBC: < 5 /HPF / WBC < 5 /HPF   Sq Epi: x / Non Sq Epi: 0-5 /HPF / Bacteria: Moderate /HPF    Imaging:  Reviewed    < from: CT Abdomen and Pelvis w/ IV Cont (21 @ 18:18) >  IMPRESSION:  Motion degraded study.  1.  Bilateral lower lobe consolidations, likely aspiration pneumonia.  2.  Subtle area of groundglass opacity in the right middle lobe, likely infectious/inflammatory.  3.  Subtle focal fat stranding in the right upper quadrant adjacent to hepatic flexure, may be artifactual versus adjacent small diverticulitis. However evaluation is limited.  4.  Large rectal stool burden with probable stercoral proctitis.    < from: CT Chest w/ IV Cont (21 @ 18:18) >  IMPRESSION:  Motion degraded study.  1.  Bilateral lower lobe consolidations, likely aspiration pneumonia.  2.  Subtle area of groundglass opacity in the right middle lobe, likely infectious/inflammatory.  3.  Subtle focal fat stranding in the right upper quadrant adjacent to hepatic flexure, may be artifactual versus adjacent small diverticulitis. However evaluation is limited.  4.  Large rectal stool burden with probable stercoral proctitis.    < from: CT Head No Cont (21 @ 18:18) >  IMPRESSION: No intracranial hemorrhage or acute transcortical infarct. Generalized volume loss with small vessel ischemic disease.    < from: Xray Chest 1 View-PORTABLE IMMEDIATE (21 @ 14:03) >  Findings/  impression: Left basilar opacity. Heart and mediastinum are unremarkable. Thoracic spine degenerative changes. Scoliosis    Due to public health emergency during pandemic and requirements to limit consultant interaction, please refer to primary team note for physical exam, ROS, and comprehensive social history.  PEx:  T(C): 36.9 (21 @ 09:35), Max: 37.3 (21 @ 22:55)  HR: 59 (21 @ 15:10) (59 - 84)  BP: 117/67 (21 @ 09:35) (117/66 - 137/76)  RR: 18 (21 @ 15:10) (17 - 18)  SpO2: 95% (21 @ 15:10) (95% - 99%)  Wt(kg): --  Preadmit Karnofsky:  40%             Current Karnofsky:    30 %  http://www.npcrc.org/files/news/karnofsky_performance_scale.pdf   http://www.npcrc.org/files/news/palliative_performance_scale_PPSv2.pdf    BMI: 16.9  Wt: 51.8  Cachexia (Y/N): N    ADVANCE DIRECTIVES  -DNR/DNI    Decision maker: The patient is UNable to participate in complex medical decision making conversations.   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 Per aide upon arrival to ED, the patient has been groaning more frequently and points to his abdomen. Last BM was on  and no blood noted in BMs.     ED Vitals: T 98.1F, /76, HR 64, RR 17, SpO2 96% RA  ED Labs: WBC 4.45, Hb 13.2, plt 120, Na 142, K+ 3.8, BUN 22, sCr 0.72, glucose 103, COVID-19 PCR positive   ED Imaging: CXR shwoing L basilar opacity. CTH noncontrast: no acute pathologies, generalized volume loss. CT chest/abd/pelvis: +Bibasilar lower lobe consolidations likely aspiration PNA, subtle GGO in RML. +Large rectal stool burden with probable stercoral proctitis  ED Management: IV CTX 1g x1, IV flagyl 500mg x1, IV vanc 1500mg x1. 500cc NS bolus x1. IM haldol 2mg x1, IV ativan 1mg x1. Fleet enema x1. Manual disimpaction by surgery team (2021 21:14)    PAST MEDICAL & SURGICAL HISTORY:  Schizophrenia, acute undifferentiated  Bipolar 1 disorder  Insomnia  Dementia  Alzheimer disease  No significant past surgical history    FAMILY HISTORY:   Reviewed and found non contributory in mother or father    Due to public health emergency during pandemic and requirements to limit consultant interaction, please refer to primary team note for physical exam, ROS, and comprehensive social history.  SOCIAL HISTORY: resident of [address] [place] [address] #[phone]. Wife, Ann, is practicing psychiatrist #[phone]    Allergies  No Known Allergies  Intolerances    Opiate Naive (Y/N): Y  -iStop reviewed (Y/N): Yes.   No Rx found on iStop review (Ref#:   [id number]    Medications:      MEDICATIONS  (STANDING):  enoxaparin Injectable 40 milliGRAM(s) SubCutaneous every 24 hours  melatonin 3 milliGRAM(s) Oral at bedtime  QUEtiapine 25 milliGRAM(s) Oral at bedtime  senna 2 Tablet(s) Oral at bedtime    MEDICATIONS  (PRN):  Labs:    CBC:                        12.3   4.06  )-----------( 107      ( 2021 07:28 )             37.9     CMP:        142  |  104  |  17  ----------------------------<  90  3.5   |  26  |  0.73    Ca    8.5      2021 07:28  Phos  3.2       Mg     2.0         TPro  5.6<L>  /  Alb  2.8<L>  /  TBili  0.3  /  DBili  x   /  AST  23  /  ALT  15  /  AlkPhos  54     Albumin, Serum: 2.8 g/dL (21 @ 07:28)       Urinalysis Basic - ( 2021 15:28 )    Color: Yellow / Appearance: Clear / S.025 / pH: x  Gluc: x / Ketone: 15 mg/dL  / Bili: Negative / Urobili: 1.0 E.U./dL   Blood: x / Protein: Trace mg/dL / Nitrite: NEGATIVE   Leuk Esterase: NEGATIVE / RBC: < 5 /HPF / WBC < 5 /HPF   Sq Epi: x / Non Sq Epi: 0-5 /HPF / Bacteria: Moderate /HPF    Imaging:  Reviewed    < from: CT Abdomen and Pelvis w/ IV Cont (21 @ 18:18) >  IMPRESSION:  Motion degraded study.  1.  Bilateral lower lobe consolidations, likely aspiration pneumonia.  2.  Subtle area of groundglass opacity in the right middle lobe, likely infectious/inflammatory.  3.  Subtle focal fat stranding in the right upper quadrant adjacent to hepatic flexure, may be artifactual versus adjacent small diverticulitis. However evaluation is limited.  4.  Large rectal stool burden with probable stercoral proctitis.    < from: CT Chest w/ IV Cont (21 @ 18:18) >  IMPRESSION:  Motion degraded study.  1.  Bilateral lower lobe consolidations, likely aspiration pneumonia.  2.  Subtle area of groundglass opacity in the right middle lobe, likely infectious/inflammatory.  3.  Subtle focal fat stranding in the right upper quadrant adjacent to hepatic flexure, may be artifactual versus adjacent small diverticulitis. However evaluation is limited.  4.  Large rectal stool burden with probable stercoral proctitis.    < from: CT Head No Cont (21 @ 18:18) >  IMPRESSION: No intracranial hemorrhage or acute transcortical infarct. Generalized volume loss with small vessel ischemic disease.    < from: Xray Chest 1 View-PORTABLE IMMEDIATE (21 @ 14:03) >  Findings/  impression: Left basilar opacity. Heart and mediastinum are unremarkable. Thoracic spine degenerative changes. Scoliosis    Due to public health emergency during pandemic and requirements to limit consultant interaction, please refer to primary team note for physical exam, ROS, and comprehensive social history.  PEx:  T(C): 36.9 (21 @ 09:35), Max: 37.3 (21 @ 22:55)  HR: 59 (21 @ 15:10) (59 - 84)  BP: 117/67 (21 @ 09:35) (117/66 - 137/76)  RR: 18 (21 @ 15:10) (17 - 18)  SpO2: 95% (21 @ 15:10) (95% - 99%)  Wt(kg): --  Preadmit Karnofsky:  40%             Current Karnofsky:    30 %  http://www.npcrc.org/files/news/karnofsky_performance_scale.pdf   http://www.npcrc.org/files/news/palliative_performance_scale_PPSv2.pdf    BMI: 16.9  Wt: 51.8  Cachexia (Y/N): N    ADVANCE DIRECTIVES  -DNR/DNI    Decision maker: The patient is UNable to participate in complex medical decision making conversations.   Legal surrogate: wife, Ann, #[phone]    GOALS OF CARE DISCUSSION  -Telephone call to Zena  at [address] [place] [address] for collateral	      -Advanced Directives addressed,  confirming DNR DNI MOLST	        REFERRALS: PIERCE

## 2021-01-28 NOTE — CONSULT NOTE ADULT - PROBLEM SELECTOR RECOMMENDATION 8
-geriatric male with progressive LB dementia, concern for aspiration risk, risk for readmissions not yet hospice appropriate   -Not eating/refusing meals this admission  -detention WD has not noted any obvious decline / change in PO intake in recent weeks  -Agitation this admission likely exacerbated by social isolation, unfamiliar environment, having to be bedbound, confusion when interacting with staff wearing PPE  -GOC ongoing  -will continue to follow with you -geriatric male with progressive LB dementia, concern for aspiration risk, risk for readmissions not yet hospice appropriate   -Not eating/refusing meals this admission  -Agitation this admission likely exacerbated by social isolation, unfamiliar environment, having to be bedbound, confusion when interacting with staff wearing PPE  -GOC ongoing  -will continue to follow with you

## 2021-01-28 NOTE — DIETITIAN INITIAL EVALUATION ADULT. - FACTORS AFF FOOD INTAKE
SLP unable to complete a formal evaluation due to patients dementia and combative behavior/change in mental status

## 2021-01-28 NOTE — DIETITIAN NUTRITION RISK NOTIFICATION - TREATMENT: THE FOLLOWING DIET HAS BEEN RECOMMENDED
Diet, Dysphagia 2 Mechanical Soft-Thin Liquids:   Supplement Feeding Modality:  Oral  Ensure Enlive Servings Per Day:  1       Frequency:  Two Times a day (01-28-21 @ 15:31) [Active]

## 2021-01-28 NOTE — CONSULT NOTE ADULT - CONSULT REASON
supportive care for geriatric male with multiple comorbidities supportive care for geriatric male with progressive dementia, risk for aspiration, refusing PO this admission, risk for readmission

## 2021-01-28 NOTE — DIETITIAN INITIAL EVALUATION ADULT. - ORAL INTAKE PTA/DIET HISTORY
as per NP she spoke with assisted living RN,who reported patient has been eating soft foods without issues up until a few days ago.

## 2021-01-28 NOTE — PROGRESS NOTE ADULT - SUBJECTIVE AND OBJECTIVE BOX
OVERNIGHT EVENTS: None    SUBJECTIVE / INTERVAL HPI: Patient seen and examined at bedside. AAOx0, non verbal, no acute distress, resting comfortably on room air. Not groaning.    VITAL SIGNS:  Vital Signs Last 24 Hrs  T(C): 36.9 (28 Jan 2021 09:35), Max: 37.3 (27 Jan 2021 22:55)  T(F): 98.5 (28 Jan 2021 09:35), Max: 99.1 (27 Jan 2021 22:55)  HR: 68 (28 Jan 2021 09:35) (62 - 84)  BP: 117/67 (28 Jan 2021 09:35) (117/66 - 137/76)  BP(mean): --  RR: 18 (28 Jan 2021 13:08) (17 - 18)  SpO2: 95% (28 Jan 2021 13:08) (95% - 99%)  I&O's Summary    28 Jan 2021 07:01  -  28 Jan 2021 14:56  --------------------------------------------------------  IN: 0 mL / OUT: 150 mL / NET: -150 mL        PHYSICAL EXAM:    General: WDWN  HEENT: NC/AT; PERRL, anicteric sclera; MMM  Neck: supple  Cardiovascular: +S1/S2; RRR  Respiratory: CTA B/L; no W/R/R  Gastrointestinal: soft, NT/ND; +BSx4  Extremities: WWP; no edema, clubbing or cyanosis  Vascular: 2+ radial, DP/PT pulses B/L  Neurological: AAOx3; no focal deficits    MEDICATIONS:  MEDICATIONS  (STANDING):  enoxaparin Injectable 40 milliGRAM(s) SubCutaneous every 24 hours  melatonin 3 milliGRAM(s) Oral at bedtime  senna 2 Tablet(s) Oral at bedtime    MEDICATIONS  (PRN):      ALLERGIES:  Allergies    No Known Allergies    Intolerances        LABS:                        12.3   4.06  )-----------( 107      ( 28 Jan 2021 07:28 )             37.9     01-28    142  |  104  |  17  ----------------------------<  90  3.5   |  26  |  0.73    Ca    8.5      28 Jan 2021 07:28  Phos  3.2     01-28  Mg     2.0     01-28    TPro  5.6<L>  /  Alb  2.8<L>  /  TBili  0.3  /  DBili  x   /  AST  23  /  ALT  15  /  AlkPhos  54  01-28        CAPILLARY BLOOD GLUCOSE          RADIOLOGY & ADDITIONAL TESTS: Reviewed.

## 2021-01-28 NOTE — CONSULT NOTE ADULT - ASSESSMENT
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 Palliative Care consulted for supportive and goals of care.  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 Palliative Care consulted for supportive and goals of care.

## 2021-01-28 NOTE — PROGRESS NOTE ADULT - PROBLEM SELECTOR PLAN 5
Hx of bipolar and schizophrenia. Takes seroquel 25mg prn   - s/p IM haldol 2mg x1, IV ativan 1mg x1  - Make seroquel 25mg for agitation standing  - f/u AM official med/rec  - enhanced supervision Hx of bipolar and schizophrenia. Takes seroquel 25mg prn   - s/p IM haldol 2mg x1, IV ativan 1mg x1  - Make seroquel 25mg for agitation standing  - enhanced supervision

## 2021-01-28 NOTE — CONSULT NOTE ADULT - PROBLEM SELECTOR RECOMMENDATION 4
-bilateral LL consolidations cf aspiration pna on chest CT 1/27  -likely silently aspiration   -per group home WD, no recent hx of coughing with PO, aspirating. eats finger foods throughout the day  -ss recs as above  -appreciate primary team medical management -bilateral LL consolidations cf aspiration pna on chest CT 1/27  -likely silently aspiration   -per [facility] WD, no recent hx of coughing with PO, aspirating  -ss recs as above  -appreciate primary team medical management

## 2021-01-28 NOTE — CONSULT NOTE ADULT - PROBLEM SELECTOR RECOMMENDATION 9
CTAP on admission cw large rectal stool burden, likely stercoral proctitis. sp disimpaction. LBM prior to admission 1/26.   -collateral obtained from [facility] : pt w regular BMs daily, incontinent. -Pt OOB and ambulatory throughout the day, rarely sits   -recommend standing BR  -Miralax qAM if pt tolerates / doesn't refuse  -Senna 2 tabs qHS

## 2021-01-28 NOTE — CONSULT NOTE ADULT - PROBLEM SELECTOR RECOMMENDATION 2
nonverbal baseline  -per group home WD: receives Seroquel nightly, ambulatory, incontinent, eats finger foods throughout the day and will not sit at table for any of his meals  -per WD, pt w episodes agitation, but does not normally strike staff. PPE exacerbates pt's agitation. Responds to calm approach. Seroquel nightly.   -Pt agitated during SS eval today  -Agitation this admission likely exacerbated by social isolation, unfamiliar environment, having to be bed bound, confusion when interacting with staff wearing PPE  -please start Seroquel 25 mg PO qHS standing  -can consider Seroquel 25 mg PO qD PRN breakthrough agitation nonverbal baseline  -per [facility] WD: needs x1 assistance with ADLs, usually responds to cues, incontinent, ambulatory, eats finger foods throughout the day, will not sit at table for any of his meals  -per WD, pt w episodes agitation, but does not normally strike staff. PPE exacerbates pt's agitation. Responds to calm approach. Seroquel nightly.   -Pt agitated during SS eval today  -Agitation this admission likely exacerbated by social isolation, unfamiliar environment, having to be bed bound, confusion when interacting with staff wearing PPE  -please start Seroquel 25 mg PO qHS standing  -can consider Seroquel 25 mg PO qD PRN breakthrough agitation

## 2021-01-28 NOTE — PROGRESS NOTE ADULT - PROBLEM SELECTOR PLAN 1
Pt presents with 1 week of abdominal and lower back pain. Found to have stool impaction, s/p disimpaction by surgery in the ED. No acute surgical intervention and no abx recommended by surgery at this time.  - 3 BM's on admission   - f/u surgery recs  - c/w bowel regimen with miralax and senna  - s/p IV CTX 1g x1, IV flagyl 500mg x1, IV vanc 1500mg x1 in the ED  - pt afebrile, no leukocytosis and no signs of active infection  - will hold off on further antibiotics at this time

## 2021-01-28 NOTE — CONSULT NOTE ADULT - PROBLEM SELECTOR RECOMMENDATION 5
+pcr on admission  -CT chest 1/27 cw GGO in RML   -comfortable on RA  -appreciate primary team medical management

## 2021-01-28 NOTE — DIETITIAN INITIAL EVALUATION ADULT. - FEEDING SKILL
high aspiration risk potential due to dementia.Will need all aspiration precautions maintained/supervision

## 2021-01-28 NOTE — CONSULT NOTE ADULT - PROBLEM SELECTOR RECOMMENDATION 3
Not eating during admission. per [facility] WD, pt snacks throughout the day, does not sit, spends most waking hours ambulating, wandering.   -bmi 16.9, wt 51.8  -alb 2.8  -appreciate ss recs: mechanical soft thin liquids Not eating.   -per [facility] WD, pt snacks throughout the day, does not sit, spends most waking hours ambulating, wandering/active  -long term WD has not noted any obvious decline / change in PO intake in recent weeks  -bmi 16.9, wt 51.8  -alb 2.8  -appreciate ss recs: mechanical soft thin liquids Not eating.   -intermediate WD dementia collateral as above, has not noted any obvious decline / change in PO intake in recent weeks  -bmi 16.9, wt 51.8  -alb 2.8  -appreciate ss recs: mechanical soft thin liquids

## 2021-01-28 NOTE — DIETITIAN INITIAL EVALUATION ADULT. - OTHER INFO
79y/o male (DNR/DNI) with dementia and Lewy body,Bi-polar ,Schizophrenia,Prostatectomy came from NH with 1 week of abdominal pain and back pain. S/P 5BM's per RN.No N/V/D.Skin intact.Unable to conduct face to face evaluation due to COVID restrictions.Noted low BMI/71% of IBW.Unable to obtain UBW at this time.Per discussion with SLP and Palliative NP decision to start oral diet of mechanical soft thin with high aspiration precautions.RD will follow at high risk.Recommended addition of Ensure Enlive BID(700kcal and 40gmprotein)

## 2021-01-29 DIAGNOSIS — G31.83 NEUROCOGNITIVE DISORDER WITH LEWY BODIES: ICD-10-CM

## 2021-01-29 LAB
ALBUMIN SERPL ELPH-MCNC: 3 G/DL — LOW (ref 3.3–5)
ALP SERPL-CCNC: 65 U/L — SIGNIFICANT CHANGE UP (ref 40–120)
ALT FLD-CCNC: 17 U/L — SIGNIFICANT CHANGE UP (ref 10–45)
ANION GAP SERPL CALC-SCNC: 13 MMOL/L — SIGNIFICANT CHANGE UP (ref 5–17)
AST SERPL-CCNC: 25 U/L — SIGNIFICANT CHANGE UP (ref 10–40)
BASOPHILS # BLD AUTO: 0 K/UL — SIGNIFICANT CHANGE UP (ref 0–0.2)
BASOPHILS NFR BLD AUTO: 0 % — SIGNIFICANT CHANGE UP (ref 0–2)
BILIRUB SERPL-MCNC: 0.3 MG/DL — SIGNIFICANT CHANGE UP (ref 0.2–1.2)
BUN SERPL-MCNC: 22 MG/DL — SIGNIFICANT CHANGE UP (ref 7–23)
CALCIUM SERPL-MCNC: 8.3 MG/DL — LOW (ref 8.4–10.5)
CHLORIDE SERPL-SCNC: 107 MMOL/L — SIGNIFICANT CHANGE UP (ref 96–108)
CO2 SERPL-SCNC: 26 MMOL/L — SIGNIFICANT CHANGE UP (ref 22–31)
CREAT SERPL-MCNC: 0.73 MG/DL — SIGNIFICANT CHANGE UP (ref 0.5–1.3)
CRP SERPL-MCNC: 7.69 MG/DL — HIGH (ref 0–0.4)
D DIMER BLD IA.RAPID-MCNC: 652 NG/ML DDU — HIGH
EOSINOPHIL # BLD AUTO: 0 K/UL — SIGNIFICANT CHANGE UP (ref 0–0.5)
EOSINOPHIL NFR BLD AUTO: 0 % — SIGNIFICANT CHANGE UP (ref 0–6)
FERRITIN SERPL-MCNC: 526 NG/ML — HIGH (ref 30–400)
GIANT PLATELETS BLD QL SMEAR: PRESENT — SIGNIFICANT CHANGE UP
GLUCOSE SERPL-MCNC: 93 MG/DL — SIGNIFICANT CHANGE UP (ref 70–99)
HCT VFR BLD CALC: 41.4 % — SIGNIFICANT CHANGE UP (ref 39–50)
HGB BLD-MCNC: 13.5 G/DL — SIGNIFICANT CHANGE UP (ref 13–17)
LYMPHOCYTES # BLD AUTO: 0.3 K/UL — LOW (ref 1–3.3)
LYMPHOCYTES # BLD AUTO: 8 % — LOW (ref 13–44)
MAGNESIUM SERPL-MCNC: 2 MG/DL — SIGNIFICANT CHANGE UP (ref 1.6–2.6)
MANUAL SMEAR VERIFICATION: SIGNIFICANT CHANGE UP
MCHC RBC-ENTMCNC: 29 PG — SIGNIFICANT CHANGE UP (ref 27–34)
MCHC RBC-ENTMCNC: 32.6 GM/DL — SIGNIFICANT CHANGE UP (ref 32–36)
MCV RBC AUTO: 89 FL — SIGNIFICANT CHANGE UP (ref 80–100)
MONOCYTES # BLD AUTO: 0.83 K/UL — SIGNIFICANT CHANGE UP (ref 0–0.9)
MONOCYTES NFR BLD AUTO: 22.3 % — HIGH (ref 2–14)
NEUTROPHILS # BLD AUTO: 2.25 K/UL — SIGNIFICANT CHANGE UP (ref 1.8–7.4)
NEUTROPHILS NFR BLD AUTO: 55.4 % — SIGNIFICANT CHANGE UP (ref 43–77)
NEUTS BAND # BLD: 5.4 % — SIGNIFICANT CHANGE UP (ref 0–8)
OVALOCYTES BLD QL SMEAR: SLIGHT — SIGNIFICANT CHANGE UP
PHOSPHATE SERPL-MCNC: 2.9 MG/DL — SIGNIFICANT CHANGE UP (ref 2.5–4.5)
PLAT MORPH BLD: ABNORMAL
PLATELET # BLD AUTO: 140 K/UL — LOW (ref 150–400)
POIKILOCYTOSIS BLD QL AUTO: SLIGHT — SIGNIFICANT CHANGE UP
POLYCHROMASIA BLD QL SMEAR: SLIGHT — SIGNIFICANT CHANGE UP
POTASSIUM SERPL-MCNC: 3.9 MMOL/L — SIGNIFICANT CHANGE UP (ref 3.5–5.3)
POTASSIUM SERPL-SCNC: 3.9 MMOL/L — SIGNIFICANT CHANGE UP (ref 3.5–5.3)
PROT SERPL-MCNC: 5.9 G/DL — LOW (ref 6–8.3)
RBC # BLD: 4.65 M/UL — SIGNIFICANT CHANGE UP (ref 4.2–5.8)
RBC # FLD: 14.2 % — SIGNIFICANT CHANGE UP (ref 10.3–14.5)
RBC BLD AUTO: ABNORMAL
SARS-COV-2 RNA SPEC QL NAA+PROBE: POSITIVE
SMUDGE CELLS # BLD: PRESENT — SIGNIFICANT CHANGE UP
SODIUM SERPL-SCNC: 146 MMOL/L — HIGH (ref 135–145)
VARIANT LYMPHS # BLD: 8.9 % — HIGH (ref 0–6)
WBC # BLD: 3.7 K/UL — LOW (ref 3.8–10.5)
WBC # FLD AUTO: 3.7 K/UL — LOW (ref 3.8–10.5)

## 2021-01-29 PROCEDURE — 99233 SBSQ HOSP IP/OBS HIGH 50: CPT | Mod: CS

## 2021-01-29 PROCEDURE — 99233 SBSQ HOSP IP/OBS HIGH 50: CPT | Mod: GC

## 2021-01-29 PROCEDURE — 99223 1ST HOSP IP/OBS HIGH 75: CPT

## 2021-01-29 RX ORDER — QUETIAPINE FUMARATE 200 MG/1
25 TABLET, FILM COATED ORAL DAILY
Refills: 0 | Status: DISCONTINUED | OUTPATIENT
Start: 2021-01-30 | End: 2021-02-01

## 2021-01-29 RX ADMIN — POLYETHYLENE GLYCOL 3350 17 GRAM(S): 17 POWDER, FOR SOLUTION ORAL at 11:29

## 2021-01-29 RX ADMIN — SENNA PLUS 2 TABLET(S): 8.6 TABLET ORAL at 22:28

## 2021-01-29 RX ADMIN — QUETIAPINE FUMARATE 25 MILLIGRAM(S): 200 TABLET, FILM COATED ORAL at 22:28

## 2021-01-29 RX ADMIN — Medication 3 MILLIGRAM(S): at 22:28

## 2021-01-29 RX ADMIN — ENOXAPARIN SODIUM 40 MILLIGRAM(S): 100 INJECTION SUBCUTANEOUS at 00:46

## 2021-01-29 NOTE — PROGRESS NOTE ADULT - PROBLEM SELECTOR PLAN 1
CTAP on admission cw large rectal stool burden, likely stercoral proctitis. sp disimpaction. LBM prior to admission 1/26.   -collateral obtained from [facility] : pt w regular BMs daily, incontinent. -Pt OOB and ambulatory throughout the day, rarely sits   -recommend standing BR  -Miralax qAM if pt tolerates / doesn't refuse  -Senna 2 tabs qHS

## 2021-01-29 NOTE — BEHAVIORAL HEALTH ASSESSMENT NOTE - NS ED BHA SUICIDALITY PRESENT CURRENT PASSIVE IDEATION
CONSULT - LACTATION  Baby Jeet Fu 0 days male MRN: [id number]    [address] Room / Bed: (N)/(N) Encounter: [id number]    Maternal Information     MOTHER:  Ashish Fu  Maternal Age: 32 y o    OB History: # 1 - Date: 21, Sex: Male, Weight: 3170 g (6 lb 15 8 oz), GA: 37w3d, Delivery: , Low Transverse, Apgar1: 8, Apgar5: 9, Living: Living, Birth Comments: None   Previouse breast reduction surgery? No    Lactation history:   Has patient previously breast fed: No   How long had patient previously breast fed:     Previous breast feeding complications:       Past Surgical History:   Procedure Laterality Date    APPENDECTOMY      CHOLECYSTECTOMY LAPAROSCOPIC N/A 2020    Procedure: CHOLECYSTECTOMY LAPAROSCOPIC;  Surgeon: Demetrio Cole DO;  Location: [facility];  Service: General    WISDOM TOOTH EXTRACTION          Birth information:  YOB: 2021   Time of birth: 12:52 PM   Sex: male   Delivery type: , Low Transverse   Birth Weight: 3170 g (6 lb 15 8 oz)   Percent of Weight Change: 0%     Gestational Age: 44w3d   [unfilled]    Assessment     Breast and nipple assessment: normal assessment     Assessment: sleepy    Feeding assessment: no latch  LATCH:  Latch: Repeated attempts, hold nipple in mouth, stimulate to suck   Audible Swallowing: None   Type of Nipple: Everted (After stimulation)   Comfort (Breast/Nipple): Soft/non-tender   Hold (Positioning): Partial assist, teach one side, mother does other, staff holds   LATCH Score: 6          Feeding recommendations:  breast feed on demand     Met with mother  Provided mother with Ready, Set, Baby booklet  Discussed Skin to Skin contact an benefits to mom and baby  Talked about the delay of the first bath until baby has adjusted  Spoke about the benefits of rooming in  Feeding on cue and what that means for recognizing infant's hunger   Avoidance of pacifiers for the first month discussed  Talked about exclusive breastfeeding for the first 6 months  Positioning and latch reviewed as well as showing images of other feeding positions  Discussed the properties of a good latch in any position  Reviewed hand/manual expression  Discussed s/s that baby is getting enough milk and some s/s that breastfeeding dyad may need further help  Gave information on common concerns, what to expect the first few weeks after delivery, preparing for other caregivers, and how partners can help  Resources for support also provided  Information on hand expression given  Discussed benefits of knowing how to manually express breast including stimulating milk supply, softening nipple for latch and evacuating breast in the event of engorgement  Discussed 2nd night syndrome and ways to calm infant  Hand out given  Assisted Mom with placing baby in football hold first on the R breast and then on the L  Throughout attempt Mom offers hand-expressed colostrum  She is very effective at expressing  Enc her to cont feedings skin to skin and offering colostrum  Baby gapes a few times but does not maintain latch  Enc Mom that a disinterested  at this age of life is very normal and reassured her that the colostrum was beneficial for him  Enc her to call throughout her stay for lactation support       Estrella Abdul RN 2021 7:07 PM Unable to assess

## 2021-01-29 NOTE — PROGRESS NOTE ADULT - PROBLEM SELECTOR PLAN 2
BMI 16.9. Eating very little inpatient. Reportedly was ambulating well at assisted living facility.  - 1/29 made comfort care. MEWS exempt  - Palliative consulted  - DNR/DNI  - Patient refusing to complete speech and swallow evaluation; recommend thin liquids and mechanical soft diet  - Refused PT    #Agitation. Patient appears uncomfortable  - has received all of Dementia, Bipolar, and schizophrenia meds  - Psych consult

## 2021-01-29 NOTE — PHYSICAL THERAPY INITIAL EVALUATION ADULT - GENERAL OBSERVATIONS, REHAB EVAL
Patient received semi-ackerman in bed  in NAD on RA, +SCDs, +Heplock. Cleared by KIMMY Santana. Agreeable to PT.

## 2021-01-29 NOTE — PROGRESS NOTE ADULT - SUBJECTIVE AND OBJECTIVE BOX
OVERNIGHT EVENTS: None    SUBJECTIVE / INTERVAL HPI: Patient seen and examined at bedside. Still AAOx0 nonverbal, NAD, resting comfortably on RA, no groaning, does not appear in any pain or SOB.    VITAL SIGNS:  Vital Signs Last 24 Hrs  T(C): 36.7 (2021 09:49), Max: 36.7 (2021 09:49)  T(F): 98.1 (2021 09:49), Max: 98.1 (2021 09:49)  HR: 69 (2021 13:00) (62 - 69)  BP: 125/68 (2021 13:00) (125/68 - 129/77)  BP(mean): --  RR: 18 (2021 09:49) (18 - 20)  SpO2: 94% (2021 15:40) (94% - 94%)  I&O's Summary    2021 07:01  -  2021 07:00  --------------------------------------------------------  IN: 0 mL / OUT: 450 mL / NET: -450 mL        PHYSICAL EXAM:    General: Thin elderly man, AAOx0  HEENT: NC/AT; PERRL, anicteric sclera; MMM  Neck: supple  Cardiovascular: +S1/S2; RRR  Respiratory: CTA B/L; no W/R/R  Gastrointestinal: soft, NT/ND; +BSx4  Extremities: WWP; no edema, clubbing or cyanosis  Vascular: 2+ radial, DP/PT pulses B/L  Neurological: AAOx0; no focal deficits    MEDICATIONS:  MEDICATIONS  (STANDING):  enoxaparin Injectable 40 milliGRAM(s) SubCutaneous every 24 hours  melatonin 3 milliGRAM(s) Oral at bedtime  polyethylene glycol 3350 17 Gram(s) Oral daily  QUEtiapine 25 milliGRAM(s) Oral at bedtime  senna 2 Tablet(s) Oral at bedtime    MEDICATIONS  (PRN):      ALLERGIES:  Allergies    No Known Allergies    Intolerances        LABS:                        13.5   3.70  )-----------( 140      ( 2021 11:18 )             41.4         146<H>  |  107  |  22  ----------------------------<  93  3.9   |  26  |  0.73    Ca    8.3<L>      2021 11:18  Phos  2.9       Mg     2.0         TPro  5.9<L>  /  Alb  3.0<L>  /  TBili  0.3  /  DBili  x   /  AST  25  /  ALT  17  /  AlkPhos  65        Urinalysis Basic - ( 2021 15:28 )    Color: Yellow / Appearance: Clear / S.025 / pH: x  Gluc: x / Ketone: 15 mg/dL  / Bili: Negative / Urobili: 1.0 E.U./dL   Blood: x / Protein: Trace mg/dL / Nitrite: NEGATIVE   Leuk Esterase: NEGATIVE / RBC: < 5 /HPF / WBC < 5 /HPF   Sq Epi: x / Non Sq Epi: 0-5 /HPF / Bacteria: Moderate /HPF      CAPILLARY BLOOD GLUCOSE          RADIOLOGY & ADDITIONAL TESTS: Reviewed.

## 2021-01-29 NOTE — CONSULT NOTE ADULT - SUBJECTIVE AND OBJECTIVE BOX
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 Per aide upon arrival to ED, the patient has been groaning more frequently and points to his abdomen. Last BM was on  and no blood noted in BMs.     ED Vitals: T 98.1F, /76, HR 64, RR 17, SpO2 96% RA  ED Labs: WBC 4.45, Hb 13.2, plt 120, Na 142, K+ 3.8, BUN 22, sCr 0.72, glucose 103, COVID-19 PCR positive   ED Imaging: CXR shwoing L basilar opacity. CTH noncontrast: no acute pathologies, generalized volume loss. CT chest/abd/pelvis: +Bibasilar lower lobe consolidations likely aspiration PNA, subtle GGO in RML. +Large rectal stool burden with probable stercoral proctitis  ED Management: IV CTX 1g x1, IV flagyl 500mg x1, IV vanc 1500mg x1. 500cc NS bolus x1. IM haldol 2mg x1, IV ativan 1mg x1. Fleet enema x1. Manual disimpaction by surgery team (2021 21:14)      PAST MEDICAL & SURGICAL HISTORY:  Schizophrenia, acute undifferentiated    Bipolar 1 disorder    Insomnia    Dementia    Alzheimer disease    No significant past surgical history        MEDICATIONS  (STANDING):  enoxaparin Injectable 40 milliGRAM(s) SubCutaneous every 24 hours  melatonin 3 milliGRAM(s) Oral at bedtime  polyethylene glycol 3350 17 Gram(s) Oral daily  QUEtiapine 25 milliGRAM(s) Oral at bedtime  senna 2 Tablet(s) Oral at bedtime    MEDICATIONS  (PRN):        FAMILY HISTORY:      CBC Full  -  ( 2021 11:18 )  WBC Count : 3.70 K/uL  RBC Count : 4.65 M/uL  Hemoglobin : 13.5 g/dL  Hematocrit : 41.4 %  Platelet Count - Automated : 140 K/uL  Mean Cell Volume : 89.0 fl  Mean Cell Hemoglobin : 29.0 pg  Mean Cell Hemoglobin Concentration : 32.6 gm/dL  Auto Neutrophil # : x  Auto Lymphocyte # : x  Auto Monocyte # : x  Auto Eosinophil # : x  Auto Basophil # : x  Auto Neutrophil % : x  Auto Lymphocyte % : x  Auto Monocyte % : x  Auto Eosinophil % : x  Auto Basophil % : x          142  |  104  |  17  ----------------------------<  90  3.5   |  26  |  0.73    Ca    8.5      2021 07:28  Phos  2.9       Mg     2.0         TPro  5.6<L>  /  Alb  2.8<L>  /  TBili  0.3  /  DBili  x   /  AST  23  /  ALT  15  /  AlkPhos  54        Urinalysis Basic - ( 2021 15:28 )    Color: Yellow / Appearance: Clear / S.025 / pH: x  Gluc: x / Ketone: 15 mg/dL  / Bili: Negative / Urobili: 1.0 E.U./dL   Blood: x / Protein: Trace mg/dL / Nitrite: NEGATIVE   Leuk Esterase: NEGATIVE / RBC: < 5 /HPF / WBC < 5 /HPF   Sq Epi: x / Non Sq Epi: 0-5 /HPF / Bacteria: Moderate /HPF          Radiology:    < from: CT Head No Cont (21 @ 18:18) >    EXAM:  CT BRAIN                          PROCEDURE DATE:  2021          INTERPRETATION:  PROCEDURE: CT head without intravenous contrast    INDICATION: Altered mental status    TECHNIQUE: Multiple axial images were obtained at 5 mm intervals from the skull base to the vertex. Sagittal and coronal reformatted images were obtained from the axial data set. The images were reviewed in brain and bone windows.    COMPARISON: None    FINDINGS: The CT examination demonstrates intravascular contrast which may be secondary to recent procedure and clinical correlation is recommended.    There is generalized volume loss. There is no midline shift or extra axial collections. The gray white differentiation appears within normal limits. There is no intracranial hemorrhage or acute transcortical infarct. There are patchy areas of hypodensity within the periventricular white matter which may represent the sequela of small vessel ischemic disease. The bony windows demonstrates no fractures. The lenses are absent bilaterally which may be secondary to prior cataract surgery. There is mucosal thickening within the left maxillary sinus and within scattered ethmoid air cells bilaterally. The rest of the visualized paranasal sinuses are within normallimits. The mastoid air cells are well aerated.    IMPRESSION: No intracranial hemorrhage or acute transcortical infarct. Generalized volume loss with small vessel ischemic disease.        < from: CT Chest w/ IV Cont (21 @ 18:18) >    EXAM:  CT CHEST IC                          EXAM:  CT ABDOMEN AND PELVIS IC                          PROCEDURE DATE:  2021          INTERPRETATION:  CLINICAL INFORMATION: Abdominal pain, shortness of breath    COMPARISON: None.    PROCEDURE:    CT of the Chest, abdomen and pelvis was performed with intravenous contrast.  90 ml of Omnipaque 350 was injected intravenously. 10 ml were discarded. No oral contrast was given.  Sagittal and coronal reformats were performed, as well as maximum intensity projections (MIPs) of the chest.      FINDINGS:  Motion degraded study.    Lungs and airways: Bilateral posterior basal and lateral basal segment consolidation, left greater than the right. Subtle groundglass opacity in the lateral right middle lobe (5:94). Central airways are patent.  Pleura: No pleural effusion  Mediastinum And Vidhya: Patulous intrathoracic esophagus without air-fluid level. Few small mediastinal and right hilar nodes, likely reactive.  Heart: Within normal limits  Vessels: Within normal limits  Chest wall: Within normal limits.    Liver: Normal in size and morphology. 2.6 cm hypodensity in the right hepatic lobe likely hepatic cyst. Faint hypodensity in the left hepatic lobe (3:75 and hepatic segment 6 (3:85), too small to characterize. The main portal vein is patent.  Gallbladder and biliary ducts: No gallstones. No intra/extrahepatic biliary ductal dilatation.  Spleen: Within normal limits.  Pancreas: Fatty atrophy of pancreatic head. No ductal dilatation..  Adrenals: Within normal limits.  Kidneys/ureters: No hydronephrosis. No urinary calculi. Cortical scarring in the posterior aspect of the right kidney.    Bladder: Within normal limits.  Reproductive organs: Status post prostatectomy, without definite pelvic mass.    Bowel: Large stool burden in the rectum with mild rectal wall thickening and presacral fat stranding. Scattered colonic diverticulosis.  Peritoneum/retroperitoneum: Focal peritoneal fat stranding in the right upper abdomen adjacent to the hepatic flexure (3:102), of unclear etiology. Postsurgical clips in the pelvis.  Vasculature:  The aorta and its branches are normal in caliber.  Lymph nodes: Lymph nodes are not enlarged.    Bones and soft tissue: Degenerative changes in the spine..      IMPRESSION:    Motion degraded study.  1.  Bilateral lower lobe consolidations, likely aspiration pneumonia.  2.  Subtle area of groundglass opacity in the right middle lobe, likely infectious/inflammatory.  3.  Subtle focal fat stranding in the right upper quadrant adjacent to hepatic flexure, may be artifactual versus adjacent small diverticulitis. However evaluation is limited.  4.  Large rectal stool burden with probable stercoral proctitis.          Vital Signs Last 24 Hrs  T(C): 36.7 (2021 09:49), Max: 36.7 (2021 09:49)  T(F): 98.1 (2021 09:49), Max: 98.1 (2021 09:49)  HR: 62 (2021 09:49) (59 - 69)  BP: 129/72 (2021 09:49) (129/72 - 129/77)  BP(mean): --  RR: 18 (2021 09:49) (18 - 20)  SpO2: 94% (2021 09:49) (94% - 95%)    REVIEW OF SYSTEMS: as per HPI          Physical Exam:  on COVID isolation, in accordance with current standards limiting patient contact, please refer to exam performed on 2021    General: WDWN  HEENT: NC/AT; PERRL, anicteric sclera; MMM  Neck: supple  Cardiovascular: +S1/S2; RRR  Respiratory: CTA B/L; no W/R/R  Gastrointestinal: soft, NT/ND; +BSx4  Extremities: WWP; no edema, clubbing or cyanosis  Vascular: 2+ radial, DP/PT pulses B/L  Neurological: AAOx3; no focal deficits      PM&R Impression:    1) deconditioned  2) no focal weakness    Plan: cleared to begin rehab    1) Physical therapy focusing on therapeutic exercises, bed mobility/transfer out of bed evaluation, progressive ambulation with assistive devices prn.    2) Anticipated Disposition Plan/Recs:   pending functional progress

## 2021-01-29 NOTE — PROGRESS NOTE ADULT - PROBLEM SELECTOR PLAN 2
nonverbal baseline. agitated with all care and med passes this admission   -per [place] WD: needs x1 assistance with ADLs, usually responds to cues, incontinent, ambulatory, eats finger foods throughout the day, will not sit at table for any of his meals  -per WD, pt w episodes agitation, but does not normally strike staff. PPE exacerbates pt's agitation. Responds to calm approach. Seroquel nightly.   -Agitation this admission likely exacerbated by social isolation, unfamiliar environment, having to be bed bound, confusion when interacting with staff wearing PPE  -please start Seroquel 25 mg PO qHS standing  -can consider Seroquel 25 mg PO qD PRN breakthrough agitation  -if refusing meds, ok to crush and mix with PO that pt enjoys eating

## 2021-01-29 NOTE — BEHAVIORAL HEALTH ASSESSMENT NOTE - SUMMARY
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 Also found to be COVID+Pt nonverbal. Takes seroquel 25 mg qhs at SNF. Sleeping calmly when I visited.     1)Increase seroquel to 25 mg q12h.     2)No psychiatric contraindication to discharge back to SNF.

## 2021-01-29 NOTE — PHYSICAL THERAPY INITIAL EVALUATION ADULT - PERTINENT HX OF CURRENT PROBLEM, REHAB EVAL
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 Also found to be COVID+

## 2021-01-29 NOTE — PROGRESS NOTE ADULT - PROBLEM SELECTOR PLAN 8
-geriatric male with progressive LB dementia, concern for aspiration risk, risk for readmission  -Agitation this admission likely exacerbated by social isolation, unfamiliar environment, having to be bedbound, confusion when interacting with staff wearing PPE  -comfort care  -will continue to follow with you.

## 2021-01-29 NOTE — PROGRESS NOTE ADULT - SUBJECTIVE AND OBJECTIVE BOX
SUBJECTIVE: pt agitated with all care, med passes. Due to public health emergency during pandemic and requirements to limit consultant interaction, please refer to primary team note for physical exam, ROS    DNR in chart: Yes    PEx:  T(C): 36.7 (01-29-21 @ 09:49), Max: 36.7 (01-29-21 @ 09:49)  HR: 69 (01-29-21 @ 13:00) (62 - 69)  BP: 125/68 (01-29-21 @ 13:00) (125/68 - 129/77)  RR: 18 (01-29-21 @ 09:49) (18 - 20)  SpO2: 94% (01-29-21 @ 13:00) (94% - 95%)  Wt(kg): --    ALLERGIES: No Known Allergies    OPIATE NAÏVE (Y/N): Y    MEDICATIONS: REVIEWED  MEDICATIONS  (STANDING):  enoxaparin Injectable 40 milliGRAM(s) SubCutaneous every 24 hours  melatonin 3 milliGRAM(s) Oral at bedtime  polyethylene glycol 3350 17 Gram(s) Oral daily  QUEtiapine 25 milliGRAM(s) Oral at bedtime  senna 2 Tablet(s) Oral at bedtime    MEDICATIONS  (PRN):    LABS: REVIEWED  CBC:                        13.5   3.70  )-----------( 140      ( 29 Jan 2021 11:18 )             41.4     CMP:    01-29    146<H>  |  107  |  22  ----------------------------<  93  3.9   |  26  |  0.73    Ca    8.3<L>      29 Jan 2021 11:18  Phos  2.9     01-29  Mg     2.0     01-29    TPro  5.9<L>  /  Alb  3.0<L>  /  TBili  0.3  /  DBili  x   /  AST  25  /  ALT  17  /  AlkPhos  65  01-29  Albumin, Serum: 3.0 g/dL (01-29-21 @ 11:18)      IMAGING: REVIEWED    ADVANCE DIRECTIVES  -DNR/DNI    Decision maker: The patient is UNable to participate in complex medical decision making conversations.   Legal surrogate: wife, Ann, #[phone]    GOALS OF CARE DISCUSSION  -Telephone call 1/28 to Zena,  at [address] [place] [address], see initial consult note 	      -Discussion with pt's wife, Ann. Wife very involved with [employer] to guide advance care planning decisions     REFERRALS: PIERCE

## 2021-01-29 NOTE — PROGRESS NOTE ADULT - PROBLEM SELECTOR PLAN 3
-Food for comfort as tolerated by patient   -pt refusing PO, feeds. bmi 16.9, wt 51.8. alb 2.8  -appreciate ss recs: mechanical soft thin liquids

## 2021-01-29 NOTE — CONSULT NOTE ADULT - ASSESSMENT
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 Also found to be COVID+    Problem/Plan - 1:  ·  Problem: Proctitis.  Plan: Pt presents with 1 week of abdominal and lower back pain. Found to have stool impaction, s/p disimpaction by surgery in the ED. No acute surgical intervention and no abx recommended by surgery at this time.  - 3 BM's on admission   - f/u surgery recs  - c/w bowel regimen with miralax and senna  - s/p IV CTX 1g x1, IV flagyl 500mg x1, IV vanc 1500mg x1 in the ED  - pt afebrile, no leukocytosis and no signs of active infection  - will hold off on further antibiotics at this time.     Problem/Plan - 2:  ·  Problem: Severe protein-calorie malnutrition.  Plan: BMI 16.9. Eating very little inpatient. Reportedly was ambulating well at assisted living facility.  - Palliative consulted  - DNR/DNI  - Patient refusing to complete speech and swallow evaluation; recommend thin liquids and mechanical soft diet  - Refused PT.     Problem/Plan - 3:  ·  Problem: Pneumonia due to COVID-19 virus.  Plan: Pt incidentally found to have COVID-19 PCR+ on presentation. No need for supplemental oxygen at this time. CT chest showing subtle GGO in RML on CT chest.   - trend daily CRP, ferritin, d-dimer  - DVT ppx with lovenox  - pending ROSAS.     Problem/Plan - 4:  ·  Problem: Aspiration pneumonitis.  Plan: Pt found to have +Bibasilar lower lobe consolidations, subtle GGO in RML on CT chest. Pt afebrile, no leukocytosis and not septic  - Aspiration precautions  - Hold off on antibiotics at this time.     Problem/Plan - 5:  ·  Problem: Bipolar 1 disorder.  Plan: Hx of bipolar and schizophrenia. Takes seroquel 25mg prn   - s/p IM haldol 2mg x1, IV ativan 1mg x1  - Make seroquel 25mg for agitation standing  - enhanced supervision.     Problem/Plan - 6:  Problem: Insomnia. Plan: Hx of insomnia, takes melatonin 3mg qhs  - c/w melatonin.    Problem/Plan - 7:  ·  Problem: Dementia.  Plan: Hx of dementia with lewy bodies. Nonverbal at baseline. Not on any other known medications for dementia  - f/u AM official med/rec  - enhanced supervision.     Problem/Plan - 8:  ·  Problem: Constipation.  Plan: - c/w bowel regimen with miralax and senna.     Problem/Plan - 9:  ·  Problem: Nutrition, metabolism, and development symptoms.  Plan: Plan:   F: none  E: replete K<4, Mg<2  N: Regular diet  VTE Prophylaxis: lovenox  C: DNR/DNI  D: RMF.

## 2021-01-29 NOTE — PROGRESS NOTE ADULT - PROBLEM SELECTOR PLAN 1
Pt presents with 1 week of abdominal and lower back pain. Found to have stool impaction, s/p disimpaction by surgery in the ED. No acute surgical intervention and no abx recommended by surgery at this time. RESOLVING.  - 3 BM's on admission   - f/u surgery recs  - c/w bowel regimen with miralax and senna  - s/p IV CTX 1g x1, IV flagyl 500mg x1, IV vanc 1500mg x1 in the ED  - pt afebrile, no leukocytosis and no signs of active infection  - will hold off on further antibiotics at this time

## 2021-01-29 NOTE — PROGRESS NOTE ADULT - PROBLEM SELECTOR PLAN 6
-wife, Ann, very involved in 's care. Work #: [phone]  -Pt is comfort care  -No Bipap, no hi franck, no HD, no tube feeds  -food for comfort  -Wife wishes for pt to be discharged to [address] [place] [address], she does NOT want damian None Zoloft 50mg

## 2021-01-29 NOTE — BEHAVIORAL HEALTH ASSESSMENT NOTE - DESCRIPTION
dementia with Lewy bodies PSHx of prostatectomy, who presents from nursing home to ED with 1 week of abdominal and back pain, found to have stool burden s/p disimpaction and probable stercoral proctitis. Also found to be COVID+

## 2021-01-29 NOTE — BEHAVIORAL HEALTH ASSESSMENT NOTE - DIFFERENTIAL
dementia with behavioral disturbance, dementia with Lewy bodies, as per chart schizophrenia and bipolar history but difficult to ascertain that now

## 2021-01-29 NOTE — BEHAVIORAL HEALTH ASSESSMENT NOTE - HPI (INCLUDE ILLNESS QUALITY, SEVERITY, DURATION, TIMING, CONTEXT, MODIFYING FACTORS, ASSOCIATED SIGNS AND SYMPTOMS)
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 Also found to be COVID+Pt nonverbal. Takes seroquel 25 mg qhs at SNF. Sleeping calmly when I visited.

## 2021-01-29 NOTE — PROGRESS NOTE ADULT - PROBLEM SELECTOR PLAN 5
Hx of bipolar and schizophrenia. Takes seroquel 25mg prn.   - s/p IM haldol 2mg x1, IV ativan 1mg x1  - C/w seroquel 25mg for agitation standing  - enhanced supervision

## 2021-01-29 NOTE — PROGRESS NOTE ADULT - PROBLEM SELECTOR PLAN 3
Pt incidentally found to have COVID-19 PCR+ on presentation. No need for supplemental oxygen at this time. CT chest showing subtle GGO in RML on CT chest.   - trend daily CRP, ferritin, d-dimer  - DVT ppx with lovenox  - pending ROSAS- needs negative COVID swab

## 2021-01-29 NOTE — PHYSICAL THERAPY INITIAL EVALUATION ADULT - ADDITIONAL COMMENTS
Patient with dementia and baseline nonverbal. Unable to obtain Social Hx. As per chart, patient was admitted from [facility].

## 2021-01-29 NOTE — PROGRESS NOTE ADULT - ASSESSMENT
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  Palliative Care following for supportive and goals of care. Pt is comfort care.

## 2021-01-30 PROCEDURE — 99233 SBSQ HOSP IP/OBS HIGH 50: CPT

## 2021-01-30 RX ORDER — POLYETHYLENE GLYCOL 3350 17 G/17G
17 POWDER, FOR SOLUTION ORAL EVERY 12 HOURS
Refills: 0 | Status: DISCONTINUED | OUTPATIENT
Start: 2021-01-30 | End: 2021-02-05

## 2021-01-30 RX ADMIN — POLYETHYLENE GLYCOL 3350 17 GRAM(S): 17 POWDER, FOR SOLUTION ORAL at 11:01

## 2021-01-30 RX ADMIN — QUETIAPINE FUMARATE 25 MILLIGRAM(S): 200 TABLET, FILM COATED ORAL at 11:01

## 2021-01-30 NOTE — PROGRESS NOTE ADULT - SUBJECTIVE AND OBJECTIVE BOX
INTERVAL HPI/OVERNIGHT EVENTS: Patient seen and examined at bedside. No acute events overnight. Nonverbal, does not appear to be in any distress.    VITAL SIGNS:  T(F): 97.2 (01-30-21 @ 17:52)  HR: 71 (01-30-21 @ 17:52)  BP: 116/80 (01-30-21 @ 17:52)  RR: 18 (01-30-21 @ 17:52)  SpO2: 94% (01-30-21 @ 17:52)  Wt(kg): --      PHYSICAL EXAM: Limited exam in respect of family's wishes for comfort care    GEN: Asleep, comfortable. NAD.   RESP: No visible respiratory distress   NEURO: AAOx0.    MEDICATIONS  (STANDING):  melatonin 3 milliGRAM(s) Oral at bedtime  polyethylene glycol 3350 17 Gram(s) Oral daily  QUEtiapine 25 milliGRAM(s) Oral daily  QUEtiapine 25 milliGRAM(s) Oral at bedtime  senna 2 Tablet(s) Oral at bedtime    MEDICATIONS  (PRN):      Allergies    No Known Allergies    Intolerances        LABS:                        13.5   3.70  )-----------( 140      ( 29 Jan 2021 11:18 )             41.4     01-29    146<H>  |  107  |  22  ----------------------------<  93  3.9   |  26  |  0.73    Ca    8.3<L>      29 Jan 2021 11:18  Phos  2.9     01-29  Mg     2.0     01-29    TPro  5.9<L>  /  Alb  3.0<L>  /  TBili  0.3  /  DBili  x   /  AST  25  /  ALT  17  /  AlkPhos  65  01-29              EKG:    Echo:    Cath:    NST:    RADIOLOGY & ADDITIONAL TESTS:

## 2021-01-30 NOTE — PROGRESS NOTE ADULT - ASSESSMENT
A/P: 80M w/hx of Lewy Body dementia, bipolar disorder, schizophrenia adm on 1/27 from nursing home after 1 week of abdominal and back pain 2/2 stool impaction & stercoral proctitis. Also found to be COVID (+). Previously DNR/DNI, now comfort care.    #Comfort care - MEWS exemption order placed, no escalation in care. Allowing for comfort feeds  #Constipation - c/w bowel regimen  #Bipolar disorder - c/w seroquel  #Insomnia - c/w melatonin    Dispo: d/c back to nursing home. Will need COVID (-) swab prior to discharge.

## 2021-01-31 PROCEDURE — 99233 SBSQ HOSP IP/OBS HIGH 50: CPT

## 2021-01-31 RX ORDER — HALOPERIDOL DECANOATE 100 MG/ML
2 INJECTION INTRAMUSCULAR ONCE
Refills: 0 | Status: COMPLETED | OUTPATIENT
Start: 2021-01-31 | End: 2021-01-31

## 2021-01-31 RX ADMIN — HALOPERIDOL DECANOATE 2 MILLIGRAM(S): 100 INJECTION INTRAMUSCULAR at 12:38

## 2021-01-31 RX ADMIN — SENNA PLUS 2 TABLET(S): 8.6 TABLET ORAL at 22:08

## 2021-01-31 RX ADMIN — QUETIAPINE FUMARATE 25 MILLIGRAM(S): 200 TABLET, FILM COATED ORAL at 11:08

## 2021-01-31 RX ADMIN — Medication 3 MILLIGRAM(S): at 22:07

## 2021-01-31 RX ADMIN — QUETIAPINE FUMARATE 25 MILLIGRAM(S): 200 TABLET, FILM COATED ORAL at 22:08

## 2021-01-31 RX ADMIN — POLYETHYLENE GLYCOL 3350 17 GRAM(S): 17 POWDER, FOR SOLUTION ORAL at 06:03

## 2021-01-31 NOTE — PROGRESS NOTE ADULT - PROBLEM SELECTOR PLAN 2
BMI 16.9. Eating very little inpatient. Reportedly was ambulating well at assisted living facility.  - 1/29 made comfort care. MEWS exempt  - Palliative consulted  - DNR/DNI  - Patient refusing to complete speech and swallow evaluation; recommend thin liquids and mechanical soft diet  - Refused PT    #Agitation. Patient appears uncomfortable  - Refusing all of Dementia, Bipolar, and schizophrenia meds  - Psych consulted, cleared for dc back to nursing home once able to go

## 2021-01-31 NOTE — PROGRESS NOTE ADULT - SUBJECTIVE AND OBJECTIVE BOX
OVERNIGHT EVENTS: Refusing all PM meds including seroquel    SUBJECTIVE / INTERVAL HPI: Patient seen and examined at bedside. Refusing everything, appears uncomfortable    VITAL SIGNS:  Vital Signs Last 24 Hrs  T(C): 36.4 (31 Jan 2021 12:17), Max: 36.8 (30 Jan 2021 21:22)  T(F): 97.6 (31 Jan 2021 12:17), Max: 98.2 (30 Jan 2021 21:22)  HR: 74 (31 Jan 2021 13:00) (66 - 79)  BP: 126/72 (31 Jan 2021 13:00) (126/72 - 150/74)  BP(mean): --  RR: 18 (31 Jan 2021 13:00) (16 - 19)  SpO2: 94% (31 Jan 2021 13:00) (91% - 95%)  I&O's Summary      PHYSICAL EXAM:    General: Cachectic appearing  HEENT: NC/AT; PERRL, anicteric sclera; MMM  Neck: supple  Cardiovascular: +S1/S2; RRR  Respiratory: CTA B/L; no W/R/R  Gastrointestinal: soft, NT/ND; +BSx4  Extremities: WWP; no edema, clubbing or cyanosis  Vascular: 2+ radial, DP/PT pulses B/L  Neurological: AAOx3; no focal deficits    MEDICATIONS:  MEDICATIONS  (STANDING):  melatonin 3 milliGRAM(s) Oral at bedtime  polyethylene glycol 3350 17 Gram(s) Oral every 12 hours  QUEtiapine 25 milliGRAM(s) Oral at bedtime  QUEtiapine 25 milliGRAM(s) Oral daily  senna 2 Tablet(s) Oral at bedtime    MEDICATIONS  (PRN):      ALLERGIES:  Allergies    No Known Allergies    Intolerances        LABS:              CAPILLARY BLOOD GLUCOSE          RADIOLOGY & ADDITIONAL TESTS: Reviewed.

## 2021-02-01 LAB — SARS-COV-2 RNA SPEC QL NAA+PROBE: POSITIVE

## 2021-02-01 PROCEDURE — 99233 SBSQ HOSP IP/OBS HIGH 50: CPT | Mod: GC

## 2021-02-01 PROCEDURE — 99233 SBSQ HOSP IP/OBS HIGH 50: CPT | Mod: CS

## 2021-02-01 RX ORDER — QUETIAPINE FUMARATE 200 MG/1
50 TABLET, FILM COATED ORAL EVERY 12 HOURS
Refills: 0 | Status: DISCONTINUED | OUTPATIENT
Start: 2021-02-01 | End: 2021-02-02

## 2021-02-01 RX ORDER — HALOPERIDOL DECANOATE 100 MG/ML
5 INJECTION INTRAMUSCULAR ONCE
Refills: 0 | Status: DISCONTINUED | OUTPATIENT
Start: 2021-02-01 | End: 2021-02-01

## 2021-02-01 RX ADMIN — Medication 1 MILLIGRAM(S): at 19:58

## 2021-02-01 RX ADMIN — POLYETHYLENE GLYCOL 3350 17 GRAM(S): 17 POWDER, FOR SOLUTION ORAL at 20:00

## 2021-02-01 RX ADMIN — QUETIAPINE FUMARATE 25 MILLIGRAM(S): 200 TABLET, FILM COATED ORAL at 11:24

## 2021-02-01 RX ADMIN — QUETIAPINE FUMARATE 50 MILLIGRAM(S): 200 TABLET, FILM COATED ORAL at 23:31

## 2021-02-01 RX ADMIN — POLYETHYLENE GLYCOL 3350 17 GRAM(S): 17 POWDER, FOR SOLUTION ORAL at 05:53

## 2021-02-01 RX ADMIN — SENNA PLUS 2 TABLET(S): 8.6 TABLET ORAL at 23:34

## 2021-02-01 RX ADMIN — Medication 3 MILLIGRAM(S): at 23:32

## 2021-02-01 NOTE — PROGRESS NOTE ADULT - SUBJECTIVE AND OBJECTIVE BOX
OVERNIGHT EVENTS: None    SUBJECTIVE / INTERVAL HPI: Patient seen and examined at bedside. Patient agitated, spit out his meds this AM.    VITAL SIGNS:  Vital Signs Last 24 Hrs  T(C): 36.7 (31 Jan 2021 21:11), Max: 36.7 (31 Jan 2021 21:11)  T(F): 98 (31 Jan 2021 21:11), Max: 98 (31 Jan 2021 21:11)  HR: 56 (01 Feb 2021 01:01) (56 - 64)  BP: 130/69 (31 Jan 2021 21:11) (130/69 - 130/69)  BP(mean): --  RR: 17 (01 Feb 2021 01:32) (16 - 17)  SpO2: 95% (01 Feb 2021 01:32) (94% - 95%)  I&O's Summary      PHYSICAL EXAM:    General: thin elderly man  HEENT: NC/AT; PERRL, anicteric sclera; MMM  Neck: supple  Cardiovascular: +S1/S2; RRR  Respiratory: CTA B/L; no W/R/R  Gastrointestinal: soft, NT/ND; +BSx4  Extremities: WWP; no edema, clubbing or cyanosis  Vascular: 2+ radial, DP/PT pulses B/L  Neurological: AAOx3; no focal deficits    MEDICATIONS:  MEDICATIONS  (STANDING):  melatonin 3 milliGRAM(s) Oral at bedtime  polyethylene glycol 3350 17 Gram(s) Oral every 12 hours  QUEtiapine 25 milliGRAM(s) Oral at bedtime  QUEtiapine 25 milliGRAM(s) Oral daily  senna 2 Tablet(s) Oral at bedtime    MEDICATIONS  (PRN):      ALLERGIES:  Allergies    No Known Allergies    Intolerances        LABS:              CAPILLARY BLOOD GLUCOSE          RADIOLOGY & ADDITIONAL TESTS: Reviewed.

## 2021-02-01 NOTE — PROGRESS NOTE ADULT - PROBLEM SELECTOR PLAN 3
-pt refusing PO, feeds. bmi 16.9, wt 51.8. alb 3.0  -food for comfort as tolerated by patient   -appreciate ss recs: mechanical soft thin liquids  -agitation management as above pt refusing PO, feeds. bmi 16.9, wt 51.8. alb 3.0  -food for comfort as tolerated by patient   -appreciate ss recs: mechanical soft thin liquids  -agitation management as above

## 2021-02-01 NOTE — PROGRESS NOTE ADULT - PROBLEM SELECTOR PLAN 1
Pt presents with 1 week of abdominal and lower back pain. Found to have stool impaction, s/p disimpaction by surgery in the ED. No acute surgical intervention and no abx recommended by surgery at this time. RESOLVING.  - 3 BM's on admission 1/28   - c/w bowel regimen with miralax and senna  - s/p IV CTX 1g x1, IV flagyl 500mg x1, IV vanc 1500mg x1 in the ED  - pt afebrile, no leukocytosis and no signs of active infection  - will hold off on further antibiotics at this time

## 2021-02-01 NOTE — PROGRESS NOTE ADULT - PROBLEM SELECTOR PLAN 1
CTAP on admission cw large rectal stool burden, likely stercoral proctitis. sp disimpaction  -collateral obtained from [facility] : regular BMs daily, incontinent. he is OOB and ambulatory throughout the day, rarely sits   -recommend standing BR  -Miralax qAM if pt tolerates / doesn't refuse  -Senna 2 tabs qHS CTAP on admission cw large rectal stool burden, likely stercoral proctitis. sp disimpaction  -collateral obtained from [facility] : regular BMs daily, incontinent. he is OOB and ambulatory throughout the day, rarely sits   -standing BR  -Miralax qAM if pt tolerates / doesn't refuse  -Senna 2 tabs qHS

## 2021-02-01 NOTE — PROGRESS NOTE ADULT - PROBLEM SELECTOR PLAN 8
-geriatric male with progressive LB dementia with behaviors, aspiration risk, risk for readmission  -symptom management as above  -comfort care   -awaiting negative COVID for dc to [facility]   -will continue to follow with you -geriatric male with progressive LB dementia with behaviors, aspiration risk, risk for readmission  -symptom management as above  -comfort care   -dw [facility] , pt needs negative COVID followed by 10 day quarantine for dc to [facility]. [facility] does not have COVID unit/wing for residences  -will continue to follow with you

## 2021-02-01 NOTE — PROGRESS NOTE ADULT - SUBJECTIVE AND OBJECTIVE BOX
SUBJECTIVE: Due to public health emergency during pandemic and requirements to limit consultant interaction, please refer to primary team note for physical exam, ROS  per staff, pt combative, behaviors with all interventions and med passes  DNR in chart: yes    PEx:  T(C): 36.7 (01-31-21 @ 21:11), Max: 36.7 (01-31-21 @ 21:11)  HR: 56 (02-01-21 @ 01:01) (56 - 74)  BP: 130/69 (01-31-21 @ 21:11) (126/72 - 130/69)  RR: 17 (02-01-21 @ 01:32) (16 - 18)  SpO2: 95% (02-01-21 @ 01:32) (94% - 95%)  Wt(kg): --    ALLERGIES: No Known Allergies    OPIATE NAÏVE (Y/N): Yes    MEDICATIONS: REVIEWED  MEDICATIONS  (STANDING):  melatonin 3 milliGRAM(s) Oral at bedtime  polyethylene glycol 3350 17 Gram(s) Oral every 12 hours  QUEtiapine 25 milliGRAM(s) Oral at bedtime  QUEtiapine 25 milliGRAM(s) Oral daily  senna 2 Tablet(s) Oral at bedtime    MEDICATIONS  (PRN):    LABS: REVIEWED  CBC:    CMP:        Albumin, Serum: 3.0 g/dL (01-29-21 @ 11:18)    IMAGING: REVIEWED    ADVANCE DIRECTIVES  -DNR/DNI    Decision maker: The patient is UNable to participate in complex medical decision making conversations.   Legal surrogate: wife, Ann, #[phone]    GOALS OF CARE DISCUSSION  -Telephone call 1/28 to Zena  at [address] [place] [address], see initial consult note 	      -Discussion with pt's wifeAnn 1/29. Wife very involved with [employer] to guide advance care planning decisions  -Comfort care, no interventions that cause the patient distress or prolong suffering

## 2021-02-01 NOTE — PROGRESS NOTE ADULT - PROBLEM SELECTOR PLAN 6
-wife, Ann, very involved in 's care. Work #: [phone]  -Pt is comfort care. Wife does not want interventions that cause pt distress or prolong suffering.   -No Bipap, no hi franck, no HD, no tube feeds  -food for comfort  -Wife wishes for pt to be discharged to [address] [place] [address], she does NOT want damian -wife, Ann, very involved in 's care. Work #: [phone]  -Comfort care. Wife does not want interventions or workup that cause pt distress or prolong suffering.   -No Bipap, no hi franck, no HD, no tube feeds  -food for comfort  -Wife wishes for pt to be discharged to [address] [place] [address], she does NOT want damian  -tc to wife Ann. All questions answered, emotional support provided.

## 2021-02-01 NOTE — PROGRESS NOTE ADULT - ASSESSMENT
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  Palliative Care following for supportive care.

## 2021-02-01 NOTE — PROGRESS NOTE ADULT - PROBLEM SELECTOR PLAN 5
+pcr on admission. CT chest 1/27 cw GGO in RML   -comfortable on RA  -appreciate primary team medical management  -positive swab 2/1

## 2021-02-01 NOTE — PROGRESS NOTE ADULT - PROBLEM SELECTOR PLAN 2
nonverbal with behaviors at bl. at [facility] pt receives Seroquel 25 qHS. pt is combative and agitated with all care, med passes this admission. likely exacerbated by social isolation, unfamiliar environment, having to be bed bound, confusion when interacting with staff wearing PPE.   -increase Seroquel to 50 mg PO BID   -ativan 1 mg IM PRN severe agitation   -risperidone likely not helpful   -per wife pt with neuroleptic malignant syndrome w haldol nonverbal with behaviors at bl. at [facility] pt receives Seroquel 25 qHS. pt is combative and agitated with all care, med passes this admission. likely exacerbated by social isolation, unfamiliar environment, having to be bed bound, confusion when interacting with staff wearing PPE.   -increase Seroquel to 50 mg PO BID   -ativan 1 mg IM PRN severe agitation   -risperidone likely not helpful   -no haldol, neuroleptic malignant syndrome

## 2021-02-02 LAB
ALBUMIN SERPL ELPH-MCNC: 3.2 G/DL — LOW (ref 3.3–5)
ALP SERPL-CCNC: 83 U/L — SIGNIFICANT CHANGE UP (ref 40–120)
ALT FLD-CCNC: 95 U/L — HIGH (ref 10–45)
ANION GAP SERPL CALC-SCNC: 4 MMOL/L — LOW (ref 5–17)
ANISOCYTOSIS BLD QL: SLIGHT — SIGNIFICANT CHANGE UP
AST SERPL-CCNC: 68 U/L — HIGH (ref 10–40)
BASOPHILS # BLD AUTO: 0 K/UL — SIGNIFICANT CHANGE UP (ref 0–0.2)
BASOPHILS NFR BLD AUTO: 0 % — SIGNIFICANT CHANGE UP (ref 0–2)
BILIRUB SERPL-MCNC: 0.4 MG/DL — SIGNIFICANT CHANGE UP (ref 0.2–1.2)
BUN SERPL-MCNC: 29 MG/DL — HIGH (ref 7–23)
BURR CELLS BLD QL SMEAR: PRESENT — SIGNIFICANT CHANGE UP
CALCIUM SERPL-MCNC: 8.9 MG/DL — SIGNIFICANT CHANGE UP (ref 8.4–10.5)
CHLORIDE SERPL-SCNC: 119 MMOL/L — HIGH (ref 96–108)
CO2 SERPL-SCNC: 27 MMOL/L — SIGNIFICANT CHANGE UP (ref 22–31)
CREAT SERPL-MCNC: 0.79 MG/DL — SIGNIFICANT CHANGE UP (ref 0.5–1.3)
CRP SERPL-MCNC: 1.2 MG/DL — HIGH (ref 0–0.4)
D DIMER BLD IA.RAPID-MCNC: 819 NG/ML DDU — HIGH
EOSINOPHIL # BLD AUTO: 0.02 K/UL — SIGNIFICANT CHANGE UP (ref 0–0.5)
EOSINOPHIL NFR BLD AUTO: 0.9 % — SIGNIFICANT CHANGE UP (ref 0–6)
FERRITIN SERPL-MCNC: 525 NG/ML — HIGH (ref 30–400)
GIANT PLATELETS BLD QL SMEAR: PRESENT — SIGNIFICANT CHANGE UP
GLUCOSE SERPL-MCNC: 155 MG/DL — HIGH (ref 70–99)
HCT VFR BLD CALC: 46 % — SIGNIFICANT CHANGE UP (ref 39–50)
HGB BLD-MCNC: 14.4 G/DL — SIGNIFICANT CHANGE UP (ref 13–17)
LYMPHOCYTES # BLD AUTO: 0.64 K/UL — LOW (ref 1–3.3)
LYMPHOCYTES # BLD AUTO: 24.5 % — SIGNIFICANT CHANGE UP (ref 13–44)
MAGNESIUM SERPL-MCNC: 2.3 MG/DL — SIGNIFICANT CHANGE UP (ref 1.6–2.6)
MANUAL SMEAR VERIFICATION: SIGNIFICANT CHANGE UP
MCHC RBC-ENTMCNC: 28.9 PG — SIGNIFICANT CHANGE UP (ref 27–34)
MCHC RBC-ENTMCNC: 31.3 GM/DL — LOW (ref 32–36)
MCV RBC AUTO: 92.4 FL — SIGNIFICANT CHANGE UP (ref 80–100)
MICROCYTES BLD QL: SLIGHT — SIGNIFICANT CHANGE UP
MONOCYTES # BLD AUTO: 0.39 K/UL — SIGNIFICANT CHANGE UP (ref 0–0.9)
MONOCYTES NFR BLD AUTO: 14.9 % — HIGH (ref 2–14)
NEUTROPHILS # BLD AUTO: 1.53 K/UL — LOW (ref 1.8–7.4)
NEUTROPHILS NFR BLD AUTO: 58.8 % — SIGNIFICANT CHANGE UP (ref 43–77)
OVALOCYTES BLD QL SMEAR: SLIGHT — SIGNIFICANT CHANGE UP
PHOSPHATE SERPL-MCNC: 3 MG/DL — SIGNIFICANT CHANGE UP (ref 2.5–4.5)
PLAT MORPH BLD: ABNORMAL
PLATELET # BLD AUTO: 211 K/UL — SIGNIFICANT CHANGE UP (ref 150–400)
POIKILOCYTOSIS BLD QL AUTO: SLIGHT — SIGNIFICANT CHANGE UP
POLYCHROMASIA BLD QL SMEAR: SLIGHT — SIGNIFICANT CHANGE UP
POTASSIUM SERPL-MCNC: 3.6 MMOL/L — SIGNIFICANT CHANGE UP (ref 3.5–5.3)
POTASSIUM SERPL-SCNC: 3.6 MMOL/L — SIGNIFICANT CHANGE UP (ref 3.5–5.3)
PROT SERPL-MCNC: 6.5 G/DL — SIGNIFICANT CHANGE UP (ref 6–8.3)
RBC # BLD: 4.98 M/UL — SIGNIFICANT CHANGE UP (ref 4.2–5.8)
RBC # FLD: 14.6 % — HIGH (ref 10.3–14.5)
RBC BLD AUTO: ABNORMAL
SARS-COV-2 RNA SPEC QL NAA+PROBE: POSITIVE
SCHISTOCYTES BLD QL AUTO: SLIGHT — SIGNIFICANT CHANGE UP
SODIUM SERPL-SCNC: 150 MMOL/L — HIGH (ref 135–145)
SPHEROCYTES BLD QL SMEAR: SLIGHT — SIGNIFICANT CHANGE UP
VARIANT LYMPHS # BLD: 0.9 % — SIGNIFICANT CHANGE UP (ref 0–6)
WBC # BLD: 2.6 K/UL — LOW (ref 3.8–10.5)
WBC # FLD AUTO: 2.6 K/UL — LOW (ref 3.8–10.5)

## 2021-02-02 PROCEDURE — 99231 SBSQ HOSP IP/OBS SF/LOW 25: CPT

## 2021-02-02 PROCEDURE — 99233 SBSQ HOSP IP/OBS HIGH 50: CPT | Mod: GC

## 2021-02-02 RX ORDER — QUETIAPINE FUMARATE 200 MG/1
100 TABLET, FILM COATED ORAL
Refills: 0 | Status: DISCONTINUED | OUTPATIENT
Start: 2021-02-02 | End: 2021-02-04

## 2021-02-02 RX ORDER — OLANZAPINE 15 MG/1
2.5 TABLET, FILM COATED ORAL EVERY 8 HOURS
Refills: 0 | Status: DISCONTINUED | OUTPATIENT
Start: 2021-02-02 | End: 2021-02-06

## 2021-02-02 RX ORDER — SODIUM CHLORIDE 9 MG/ML
1000 INJECTION, SOLUTION INTRAVENOUS
Refills: 0 | Status: DISCONTINUED | OUTPATIENT
Start: 2021-02-02 | End: 2021-02-03

## 2021-02-02 RX ADMIN — SODIUM CHLORIDE 50 MILLILITER(S): 9 INJECTION, SOLUTION INTRAVENOUS at 16:07

## 2021-02-02 RX ADMIN — QUETIAPINE FUMARATE 100 MILLIGRAM(S): 200 TABLET, FILM COATED ORAL at 19:55

## 2021-02-02 RX ADMIN — POLYETHYLENE GLYCOL 3350 17 GRAM(S): 17 POWDER, FOR SOLUTION ORAL at 22:09

## 2021-02-02 RX ADMIN — POLYETHYLENE GLYCOL 3350 17 GRAM(S): 17 POWDER, FOR SOLUTION ORAL at 11:47

## 2021-02-02 RX ADMIN — QUETIAPINE FUMARATE 50 MILLIGRAM(S): 200 TABLET, FILM COATED ORAL at 11:47

## 2021-02-02 RX ADMIN — SENNA PLUS 2 TABLET(S): 8.6 TABLET ORAL at 22:09

## 2021-02-02 RX ADMIN — Medication 3 MILLIGRAM(S): at 22:09

## 2021-02-02 NOTE — PROGRESS NOTE ADULT - PROBLEM SELECTOR PLAN 1
Pt presents with 1 week of abdominal and lower back pain. Found to have stool impaction, s/p disimpaction by surgery in the ED. No acute surgical intervention and no abx recommended by surgery at this time.  - 3 BM's on admission 1/28   - c/w bowel regimen with miralax and senna  - give water enema 2/2  - s/p IV CTX 1g x1, IV flagyl 500mg x1, IV vanc 1500mg x1 in the ED  - pt afebrile, no leukocytosis and no signs of active infection  - Has not received further antibiotics

## 2021-02-02 NOTE — PROGRESS NOTE ADULT - PROBLEM SELECTOR PLAN 2
BMI 16.9. Eating very little inpatient. Reportedly was ambulating well at assisted living facility.  - 1/29 made comfort care. MEWS exempt  - Palliative consulted  - DNR/DNI  - Patient refusing to complete speech and swallow evaluation; recommend thin liquids and mechanical soft diet  - Refused PT    #Agitation. Patient appears uncomfortable  - Refusing all of Dementia, Bipolar, and schizophrenia meds  - Psych consulted, recommend seroquel 100 BID and olanzapine IM 2.5 q8 PRN

## 2021-02-02 NOTE — PROGRESS NOTE ADULT - ASSESSMENT
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 Also found to be COVID+.

## 2021-02-02 NOTE — PROGRESS NOTE ADULT - SUBJECTIVE AND OBJECTIVE BOX
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 Per aide upon arrival to ED, the patient has been groaning more frequently and points to his abdomen. Last BM was on 1/26th and no blood noted in BMs. Was disimpacted in the ED. Never required oxygen. Pending negative COVID swab to be dc'ed back to nursing home. Patient has not adjusted well to being in the hospital. He is scared in the new environment, agitated, and sometimes not willing to take his seroquel. Psych consulted 2/3, recommend increase to Seroquel 100 BID and IM olazapine 2.5 q8 PRN agitation. Of note, last BM was on the day of admission. Giving senna and miralax daily, but patient has been refusing enema or suppository.    OVERNIGHT EVENTS: None.    SUBJECTIVE / INTERVAL HPI: Patient seen and examined at bedside. Appears comfortable. AAOx0. Not acutely agitated at this time.    VITAL SIGNS:  Vital Signs Last 24 Hrs  T(C): 36.6 (02 Feb 2021 12:31), Max: 36.6 (02 Feb 2021 12:31)  T(F): 97.8 (02 Feb 2021 12:31), Max: 97.8 (02 Feb 2021 12:31)  HR: 66 (02 Feb 2021 12:31) (66 - 69)  BP: 121/73 (02 Feb 2021 12:31) (120/73 - 121/73)  BP(mean): 89 (01 Feb 2021 23:00) (89 - 89)  RR: 20 (02 Feb 2021 12:31) (16 - 20)  SpO2: 95% (02 Feb 2021 12:31) (94% - 95%)  I&O's Summary      PHYSICAL EXAM:    General: Thin elderly man  HEENT: NC/AT; PERRL, anicteric sclera; MMM  Neck: supple  Cardiovascular: +S1/S2; RRR  Respiratory: CTA B/L; no W/R/R  Gastrointestinal: soft, NT/ND; +BSx4  Extremities: WWP; no edema, clubbing or cyanosis  Vascular: 2+ radial, DP/PT pulses B/L  Neurological: AAOx0    MEDICATIONS:  MEDICATIONS  (STANDING):  dextrose 5%. 1000 milliLiter(s) (50 mL/Hr) IV Continuous <Continuous>  melatonin 3 milliGRAM(s) Oral at bedtime  polyethylene glycol 3350 17 Gram(s) Oral every 12 hours  QUEtiapine 100 milliGRAM(s) Oral two times a day  senna 2 Tablet(s) Oral at bedtime    MEDICATIONS  (PRN):  OLANZapine Injectable 2.5 milliGRAM(s) IntraMuscular every 8 hours PRN agitation      ALLERGIES:  Allergies    No Known Allergies    Intolerances        LABS:                        14.4   2.60  )-----------( 211      ( 02 Feb 2021 12:40 )             46.0     02-02    150<H>  |  119<H>  |  29<H>  ----------------------------<  155<H>  3.6   |  27  |  0.79    Ca    8.9      02 Feb 2021 12:40  Phos  3.0     02-02  Mg     2.3     02-02    TPro  6.5  /  Alb  3.2<L>  /  TBili  0.4  /  DBili  x   /  AST  68<H>  /  ALT  95<H>  /  AlkPhos  83  02-02        CAPILLARY BLOOD GLUCOSE          RADIOLOGY & ADDITIONAL TESTS: Reviewed.   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 Per aide upon arrival to ED, the patient has been groaning more frequently and points to his abdomen. Last BM was on 1/26th and no blood noted in BMs. Was disimpacted in the ED. Never required oxygen. Pending negative COVID swab to be dc'ed back to nursing home. Patient has not adjusted well to being in the hospital. He is scared in the new environment, agitated, and sometimes not willing to take his seroquel. Psych consulted 2/3, recommend increase to Seroquel 100 BID and IM olazapine 2.5 q8 PRN agitation. Of note, last BM was on the day of admission. Giving senna and miralax daily, but patient has been refusing enema or suppository.    OVERNIGHT EVENTS: None.    SUBJECTIVE / INTERVAL HPI: Patient seen and examined at bedside. Appears comfortable. AAOx0. Not acutely agitated at this time.    VITAL SIGNS:  Vital Signs Last 24 Hrs  T(C): 36.6 (02 Feb 2021 12:31), Max: 36.6 (02 Feb 2021 12:31)  T(F): 97.8 (02 Feb 2021 12:31), Max: 97.8 (02 Feb 2021 12:31)  HR: 66 (02 Feb 2021 12:31) (66 - 69)  BP: 121/73 (02 Feb 2021 12:31) (120/73 - 121/73)  BP(mean): 89 (01 Feb 2021 23:00) (89 - 89)  RR: 20 (02 Feb 2021 12:31) (16 - 20)  SpO2: 95% (02 Feb 2021 12:31) (94% - 95%)  I&O's Summary      PHYSICAL EXAM:    General: Thin elderly man  HEENT: NC/AT; PERRL, anicteric sclera; MMM  Neck: supple  Cardiovascular: +S1/S2; RRR  Respiratory: CTA B/L; no W/R/R  Gastrointestinal: soft, NT/ND; +BSx4  Extremities: WWP; no edema, clubbing or cyanosis  Vascular: 2+ radial, DP/PT pulses B/L  Neurological: AAOx0    MEDICATIONS:  MEDICATIONS  (STANDING):  dextrose 5%. 1000 milliLiter(s) (50 mL/Hr) IV Continuous <Continuous>  melatonin 3 milliGRAM(s) Oral at bedtime  polyethylene glycol 3350 17 Gram(s) Oral every 12 hours  QUEtiapine 100 milliGRAM(s) Oral two times a day  senna 2 Tablet(s) Oral at bedtime    MEDICATIONS  (PRN):  OLANZapine Injectable 2.5 milliGRAM(s) IntraMuscular every 8 hours PRN agitation      ALLERGIES:  Allergies    No Known Allergies    Intolerances        LABS:                        14.4   2.60  )-----------( 211      ( 02 Feb 2021 12:40 )             46.0     02-02    150<H>  |  119<H>  |  29<H>  ----------------------------<  155<H>  3.6   |  27  |  0.79    Ca    8.9      02 Feb 2021 12:40  Phos  3.0     02-02  Mg     2.3     02-02    TPro  6.5  /  Alb  3.2<L>  /  TBili  0.4  /  DBili  x   /  AST  68<H>  /  ALT  95<H>  /  AlkPhos  83  02-02        CAPILLARY BLOOD GLUCOSE          RADIOLOGY & ADDITIONAL TESTS: Reviewed.

## 2021-02-03 LAB — SARS-COV-2 RNA SPEC QL NAA+PROBE: POSITIVE

## 2021-02-03 PROCEDURE — 99231 SBSQ HOSP IP/OBS SF/LOW 25: CPT

## 2021-02-03 PROCEDURE — 99233 SBSQ HOSP IP/OBS HIGH 50: CPT | Mod: CS

## 2021-02-03 PROCEDURE — 99233 SBSQ HOSP IP/OBS HIGH 50: CPT | Mod: GC

## 2021-02-03 RX ADMIN — POLYETHYLENE GLYCOL 3350 17 GRAM(S): 17 POWDER, FOR SOLUTION ORAL at 06:20

## 2021-02-03 RX ADMIN — POLYETHYLENE GLYCOL 3350 17 GRAM(S): 17 POWDER, FOR SOLUTION ORAL at 19:03

## 2021-02-03 RX ADMIN — QUETIAPINE FUMARATE 100 MILLIGRAM(S): 200 TABLET, FILM COATED ORAL at 06:20

## 2021-02-03 RX ADMIN — OLANZAPINE 2.5 MILLIGRAM(S): 15 TABLET, FILM COATED ORAL at 22:23

## 2021-02-03 NOTE — PROGRESS NOTE ADULT - PROBLEM SELECTOR PLAN 3
Pt incidentally found to have COVID-19 PCR+ on presentation. No need for supplemental oxygen at this time. CT chest showing subtle GGO in RML on CT chest.   - trend daily CRP, ferritin, d-dimer  - DVT ppx with lovenox  - pending ROSAS- needs negative COVID swab Pt incidentally found to have COVID-19 PCR+ on presentation. No need for supplemental oxygen at this time. CT chest showing subtle GGO in RML on CT chest.   - trend daily CRP, ferritin, d-dimer  - DVT ppx with lovenox  - pending ROSAS- needs negative COVID swab  - will swab today

## 2021-02-03 NOTE — PROGRESS NOTE ADULT - PROBLEM SELECTOR PLAN 1
Pt presents with 1 week of abdominal and lower back pain. Found to have stool impaction, s/p disimpaction by surgery in the ED. No acute surgical intervention and no abx recommended by surgery at this time.  - 3 BM's on admission 1/28   - c/w bowel regimen with miralax and senna  - give water enema 2/2  - s/p IV CTX 1g x1, IV flagyl 500mg x1, IV vanc 1500mg x1 in the ED  - pt afebrile, no leukocytosis and no signs of active infection  - Has not received further antibiotics Pt presents with 1 week of abdominal and lower back pain. Found to have stool impaction, s/p disimpaction by surgery in the ED. No acute surgical intervention and no abx recommended by surgery at this time.  - 3 BM's on admission 1/28   - c/w bowel regimen with miralax and senna, water enema on 2/2  - s/p IV CTX 1g x1, IV flagyl 500mg x1, IV vanc 1500mg x1 in the ED  - pt afebrile, no leukocytosis and no signs of active infection  - Has not received further antibiotics

## 2021-02-03 NOTE — PROGRESS NOTE ADULT - PROBLEM SELECTOR PLAN 8
-geriatric male with covid, progressive LB dementia with behaviors, poor po intake, aspiration risk, risk for readmission  -symptom management and GOC as above  -Ann requesting to speak with primary team for medical update   -will continue to follow with you

## 2021-02-03 NOTE — PROGRESS NOTE ADULT - SUBJECTIVE AND OBJECTIVE BOX
Medicine Progress Note    Patient is a 80y old  Male who presents with a chief complaint of Stercoral colitis, COVID+ (02 Feb 2021 16:30)      SUBJECTIVE / OVERNIGHT EVENTS:    ADDITIONAL REVIEW OF SYSTEMS:    MEDICATIONS  (STANDING):  melatonin 3 milliGRAM(s) Oral at bedtime  polyethylene glycol 3350 17 Gram(s) Oral every 12 hours  QUEtiapine 100 milliGRAM(s) Oral two times a day  senna 2 Tablet(s) Oral at bedtime    MEDICATIONS  (PRN):  OLANZapine Injectable 2.5 milliGRAM(s) IntraMuscular every 8 hours PRN agitation    CAPILLARY BLOOD GLUCOSE        I&O's Summary    02 Feb 2021 07:01  -  03 Feb 2021 07:00  --------------------------------------------------------  IN: 600 mL / OUT: 0 mL / NET: 600 mL        PHYSICAL EXAM:  Vital Signs Last 24 Hrs  T(C): 36.4 (03 Feb 2021 09:30), Max: 36.6 (02 Feb 2021 12:31)  T(F): 97.6 (03 Feb 2021 09:30), Max: 97.8 (02 Feb 2021 12:31)  HR: 75 (03 Feb 2021 09:30) (60 - 89)  BP: 115/70 (03 Feb 2021 09:30) (115/70 - 150/85)  BP(mean): --  RR: 18 (03 Feb 2021 09:30) (18 - 20)  SpO2: 96% (03 Feb 2021 09:30) (94% - 96%)  CONSTITUTIONAL: NAD, well-developed, well-groomed  ENMT: Moist oral mucosa, no pharyngeal injection or exudates; normal dentition  RESPIRATORY: Normal respiratory effort; lungs are clear to auscultation bilaterally  CARDIOVASCULAR: Regular rate and rhythm, normal S1 and S2, no murmur/rub/gallop; No lower extremity edema; Peripheral pulses are 2+ bilaterally  ABDOMEN: Nontender to palpation, normoactive bowel sounds, no rebound/guarding; No hepatosplenomegaly  PSYCH: A+O to person, place, and time; affect appropriate  NEUROLOGY: CN 2-12 are intact and symmetric; no gross sensory deficits   SKIN: No rashes; no palpable lesions    LABS:                        14.4   2.60  )-----------( 211      ( 02 Feb 2021 12:40 )             46.0     02-02    150<H>  |  119<H>  |  29<H>  ----------------------------<  155<H>  3.6   |  27  |  0.79    Ca    8.9      02 Feb 2021 12:40  Phos  3.0     02-02  Mg     2.3     02-02    TPro  6.5  /  Alb  3.2<L>  /  TBili  0.4  /  DBili  x   /  AST  68<H>  /  ALT  95<H>  /  AlkPhos  83  02-02        COVID-19 PCR: Positive (02 Feb 2021 11:02)  COVID-19 PCR: Positive (01 Feb 2021 11:39)  COVID-19 PCR: Positive (29 Jan 2021 17:18)  COVID-19 PCR: Detected (27 Jan 2021 14:31)      RADIOLOGY & ADDITIONAL TESTS: Reviewed.     Medicine Progress Note    Patient is a 80y old  Male who presents with a chief complaint of Stercoral colitis, COVID+ (02 Feb 2021 16:30)      SUBJECTIVE / OVERNIGHT EVENTS: DEENA. Did not sleep overnight. Did not receive zyprexa. Pt seen and examined at bedside this AM.      MEDICATIONS  (STANDING):  melatonin 3 milliGRAM(s) Oral at bedtime  polyethylene glycol 3350 17 Gram(s) Oral every 12 hours  QUEtiapine 100 milliGRAM(s) Oral two times a day  senna 2 Tablet(s) Oral at bedtime    MEDICATIONS  (PRN):  OLANZapine Injectable 2.5 milliGRAM(s) IntraMuscular every 8 hours PRN agitation    CAPILLARY BLOOD GLUCOSE        I&O's Summary    02 Feb 2021 07:01  -  03 Feb 2021 07:00  --------------------------------------------------------  IN: 600 mL / OUT: 0 mL / NET: 600 mL        PHYSICAL EXAM:  Vital Signs Last 24 Hrs  T(C): 36.4 (03 Feb 2021 09:30), Max: 36.6 (02 Feb 2021 12:31)  T(F): 97.6 (03 Feb 2021 09:30), Max: 97.8 (02 Feb 2021 12:31)  HR: 75 (03 Feb 2021 09:30) (60 - 89)  BP: 115/70 (03 Feb 2021 09:30) (115/70 - 150/85)  BP(mean): --  RR: 18 (03 Feb 2021 09:30) (18 - 20)  SpO2: 96% (03 Feb 2021 09:30) (94% - 96%)  General: Thin elderly man  HEENT: NC/AT; PERRL, anicteric sclera; MMM  Neck: supple  Cardiovascular: +S1/S2; RRR  Respiratory: CTA B/L; no W/R/R  Gastrointestinal: soft, NT/ND; +BSx4  Extremities: WWP; no edema, clubbing or cyanosis  Vascular: 2+ radial, DP/PT pulses B/L  Neurological: AAOx0    LABS:                        14.4   2.60  )-----------( 211      ( 02 Feb 2021 12:40 )             46.0     02-02    150<H>  |  119<H>  |  29<H>  ----------------------------<  155<H>  3.6   |  27  |  0.79    Ca    8.9      02 Feb 2021 12:40  Phos  3.0     02-02  Mg     2.3     02-02    TPro  6.5  /  Alb  3.2<L>  /  TBili  0.4  /  DBili  x   /  AST  68<H>  /  ALT  95<H>  /  AlkPhos  83  02-02        COVID-19 PCR: Positive (02 Feb 2021 11:02)  COVID-19 PCR: Positive (01 Feb 2021 11:39)  COVID-19 PCR: Positive (29 Jan 2021 17:18)  COVID-19 PCR: Detected (27 Jan 2021 14:31)      RADIOLOGY & ADDITIONAL TESTS: Reviewed.     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 Per aide upon arrival to ED, the patient has been groaning more frequently and points to his abdomen. Last BM was on 1/26th and no blood noted in BMs. Was disimpacted in the ED. Never required oxygen. Pending negative COVID swab to be dc'ed back to nursing home. Patient has not adjusted well to being in the hospital. He is scared in the new environment, agitated, and sometimes not willing to take his seroquel. Psych consulted 2/3, recommend increase to Seroquel 100 BID and IM olazapine 2.5 q8 PRN agitation. Of note, last BM was on the day of admission. Giving senna and miralax daily, but patient has been refusing enema or suppository. Palliative consulted, DNR/DNI and comfort measures. Found to be hypernatremic likely 2/2 poor PO intake, d/cheryl for comfort. Patient pending COVID negative swab for placement back to facility.     Medicine Progress Note    Patient is a 80y old  Male who presents with a chief complaint of Stercoral colitis, COVID+ (02 Feb 2021 16:30)      SUBJECTIVE / OVERNIGHT EVENTS: DEENA. Did not sleep overnight. Did not receive zyprexa. Pt seen and examined at bedside this AM.      MEDICATIONS  (STANDING):  melatonin 3 milliGRAM(s) Oral at bedtime  polyethylene glycol 3350 17 Gram(s) Oral every 12 hours  QUEtiapine 100 milliGRAM(s) Oral two times a day  senna 2 Tablet(s) Oral at bedtime    MEDICATIONS  (PRN):  OLANZapine Injectable 2.5 milliGRAM(s) IntraMuscular every 8 hours PRN agitation    CAPILLARY BLOOD GLUCOSE        I&O's Summary    02 Feb 2021 07:01  -  03 Feb 2021 07:00  --------------------------------------------------------  IN: 600 mL / OUT: 0 mL / NET: 600 mL        PHYSICAL EXAM:  Vital Signs Last 24 Hrs  T(C): 36.4 (03 Feb 2021 09:30), Max: 36.6 (02 Feb 2021 12:31)  T(F): 97.6 (03 Feb 2021 09:30), Max: 97.8 (02 Feb 2021 12:31)  HR: 75 (03 Feb 2021 09:30) (60 - 89)  BP: 115/70 (03 Feb 2021 09:30) (115/70 - 150/85)  BP(mean): --  RR: 18 (03 Feb 2021 09:30) (18 - 20)  SpO2: 96% (03 Feb 2021 09:30) (94% - 96%)  General: Thin elderly man  HEENT: NC/AT; PERRL, anicteric sclera; MMM  Neck: supple  Cardiovascular: +S1/S2; RRR  Respiratory: CTA B/L; no W/R/R  Gastrointestinal: soft, NT/ND; +BSx4  Extremities: WWP; no edema, clubbing or cyanosis  Vascular: 2+ radial, DP/PT pulses B/L  Neurological: AAOx0    LABS:                        14.4   2.60  )-----------( 211      ( 02 Feb 2021 12:40 )             46.0     02-02    150<H>  |  119<H>  |  29<H>  ----------------------------<  155<H>  3.6   |  27  |  0.79    Ca    8.9      02 Feb 2021 12:40  Phos  3.0     02-02  Mg     2.3     02-02    TPro  6.5  /  Alb  3.2<L>  /  TBili  0.4  /  DBili  x   /  AST  68<H>  /  ALT  95<H>  /  AlkPhos  83  02-02        COVID-19 PCR: Positive (02 Feb 2021 11:02)  COVID-19 PCR: Positive (01 Feb 2021 11:39)  COVID-19 PCR: Positive (29 Jan 2021 17:18)  COVID-19 PCR: Detected (27 Jan 2021 14:31)      RADIOLOGY & ADDITIONAL TESTS: Reviewed.

## 2021-02-03 NOTE — PROGRESS NOTE ADULT - PROBLEM SELECTOR PLAN 2
nonverbal with behaviors at bl. at [facility] pt receives Seroquel 25 qHS. pt is combative, agitated with all care, med passes this admission. likely exacerbated by social isolation, unfamiliar environment, having to be bed bound, confusion when interacting with staff wearing PPE.   -psych recommending: Seroquel 100mg PO BID, Zyprexa 2.5 mg IV q8 PRN  -haldol contraindicated, neuroleptic malignant syndrome  -pt needs 24h care at discharge  -no longer appropriate for discharge to [facility] given increased needs

## 2021-02-03 NOTE — PROGRESS NOTE BEHAVIORAL HEALTH - NSBHFUPINTERVALCCFT_PSY_A_CORE
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 Also found to be COVID+Pt nonverbal. Takes seroquel 25 mg qhs at SNF. Sleeping calmly when I visited.
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 Also found to be COVID+Pt nonverbal. Takes seroquel 25 mg qhs at SNF. Sleeping calmly when I visited.

## 2021-02-03 NOTE — PROGRESS NOTE ADULT - PROBLEM SELECTOR PLAN 2
BMI 16.9. Eating very little inpatient. Reportedly was ambulating well at assisted living facility.  - 1/29 made comfort care. MEWS exempt  - Palliative consulted  - DNR/DNI  - Patient refusing to complete speech and swallow evaluation; recommend thin liquids and mechanical soft diet  - Refused PT    #Agitation. Patient appears uncomfortable  - Refusing all of Dementia, Bipolar, and schizophrenia meds  - Psych consulted, recommend seroquel 100 BID and olanzapine IM 2.5 q8 PRN BMI 16.9. Eating very little inpatient and is not improving much. Reportedly was ambulating well at assisted living facility.  - 1/29 made comfort care. MEWS exempt  - Palliative consulted  - DNR/DNI  - Patient refusing to complete speech and swallow evaluation; recommend thin liquids and mechanical soft diet  - Refused PT    #Agitation. Patient appears uncomfortable  - Refusing all of Dementia, Bipolar, and schizophrenia meds  - Psych consulted, recommend seroquel 100 BID and olanzapine IM 2.5 q8 PRN. Did not receive any overnight.

## 2021-02-03 NOTE — PROGRESS NOTE ADULT - SUBJECTIVE AND OBJECTIVE BOX
SUBJECTIVE:  Due to public health emergency during pandemic and requirements to limit consultant interaction, please refer to primary team note for physical exam, ROS.    Per chart and primary team, pt combative, spitting out meds. no po intake.     DNR in chart: Yes    PEx:  T(C): 36.4 (02-03-21 @ 09:30), Max: 36.5 (02-02-21 @ 22:15)  HR: 75 (02-03-21 @ 09:30) (60 - 89)  BP: 115/70 (02-03-21 @ 09:30) (115/70 - 150/85)  RR: 18 (02-03-21 @ 09:30) (18 - 18)  SpO2: 96% (02-03-21 @ 09:30) (94% - 96%)  Wt(kg): --    ALLERGIES: No Known Allergies    OPIATE NAÏVE (Y/N): Yes    MEDICATIONS: REVIEWED  MEDICATIONS  (STANDING):  melatonin 3 milliGRAM(s) Oral at bedtime  polyethylene glycol 3350 17 Gram(s) Oral every 12 hours  QUEtiapine 100 milliGRAM(s) Oral two times a day  senna 2 Tablet(s) Oral at bedtime    MEDICATIONS  (PRN):  OLANZapine Injectable 2.5 milliGRAM(s) IntraMuscular every 8 hours PRN agitation    LABS: REVIEWED  CBC:                        14.4   2.60  )-----------( 211      ( 02 Feb 2021 12:40 )             46.0     CMP:    02-02    150<H>  |  119<H>  |  29<H>  ----------------------------<  155<H>  3.6   |  27  |  0.79    Ca    8.9      02 Feb 2021 12:40  Phos  3.0     02-02  Mg     2.3     02-02    TPro  6.5  /  Alb  3.2<L>  /  TBili  0.4  /  DBili  x   /  AST  68<H>  /  ALT  95<H>  /  AlkPhos  83  02-02  Albumin, Serum: 3.2 g/dL (02-02-21 @ 12:40)    IMAGING: REVIEWED    ADVANCE DIRECTIVES  -DNR/DNI    Decision maker: The patient is UNable to participate in complex medical decision making conversations.   Legal surrogate: wife, Ann, #[phone]    GOALS OF CARE DISCUSSION  -Comfort care, no interventions that cause the patient distress or prolong suffering   -wife involved with [employer] society to guide advance care planning decisions  -wife wants pt to be discharged to [address] [place] [address] with palliative care, would like to avoid [facility] placement   -wife unable to care for pt at home

## 2021-02-03 NOTE — PROGRESS NOTE ADULT - ASSESSMENT
81 yo M PMHx LB dementia with behaviors, bipolar disorder, schizophrenia, PSHx of prostatectomy, who presented 1/27 from [address] [place] [address] to ED with abdominal, back pain, found to have stool burden s/p disimpaction, probable stercoral proctitis, and COVID+  .  Palliative Care following for supportive care.

## 2021-02-03 NOTE — PROGRESS NOTE BEHAVIORAL HEALTH - AXIS III
dementia with Lewy bodies PSHx of prostatectomy, who presents from nursing home to ED with 1 week of abdominal and back pain, found to have stool burden s/p disimpaction and probable stercoral proctitis. Also found to be COVID+
dementia with Lewy bodies PSHx of prostatectomy, who presents from nursing home to ED with 1 week of abdominal and back pain, found to have stool burden s/p disimpaction and probable stercoral proctitis. Also found to be COVID+

## 2021-02-03 NOTE — PROGRESS NOTE BEHAVIORAL HEALTH - SUMMARY
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 Also found to be COVID+Pt nonverbal. Takes seroquel 25 mg qhs at SNF. Sleeping calmly when I visited.     1)Continue seroquel 100 mg q12h. Can give zyprexa 2.5 mg q8h IM for agitation, jazzmine. if refusing PO seroquel.     2)No psychiatric contraindication to discharge back to SNF.    3)Optimize bowel regimen.
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 Also found to be COVID+Pt nonverbal. Takes seroquel 25 mg qhs at SNF. Sleeping calmly when I visited.     1)Increase seroquel to 100 mg q12h. Can give zyprexa 2.5 mg q8h IM for agitation, jazzmine. if refusing PO seroquel.     2)No psychiatric contraindication to discharge back to SNF.    3)Optimize bowel regimen.

## 2021-02-03 NOTE — PROGRESS NOTE ADULT - PROBLEM SELECTOR PLAN 5
+pcr on admission. CT chest 1/27 cw GGO in RML   -comfortable on RA  -appreciate primary team medical management  -positive swab 2/2

## 2021-02-03 NOTE — PROGRESS NOTE BEHAVIORAL HEALTH - RISK ASSESSMENT
at risk of disorganized behavior due to dementia/delirium
at risk of disorganized behavior due to dementia/delirium

## 2021-02-03 NOTE — PROGRESS NOTE ADULT - PROBLEM SELECTOR PLAN 5
Hx of bipolar and schizophrenia. Takes seroquel 25mg prn.   - s/p IM haldol 2mg x1, IV ativan 1mg x1  - C/w seroquel 25mg for agitation standing  - enhanced supervision Hx of bipolar and schizophrenia. Takes seroquel 25mg prn.   - C/w seroquel 25mg for agitation standing  - enhanced supervision

## 2021-02-03 NOTE — PROGRESS NOTE ADULT - PROBLEM SELECTOR PLAN 3
pt refusing PO, feeds. bmi 16.9, wt 51.8. alb 3.2  -food for comfort as tolerated by patient   -appreciate ss recs: mechanical soft thin liquids  -agitation management as above

## 2021-02-03 NOTE — PROGRESS NOTE ADULT - PROBLEM SELECTOR PLAN 1
CTAP on admission cw large rectal stool burden, stercoral proctitis. sp disimpaction, 3 BM 1/28  -standing BR  -Miralax qAM if pt tolerates / doesn't refuse  -Senna 2 tabs qHS  -appreciate medical management

## 2021-02-03 NOTE — PROGRESS NOTE ADULT - PROBLEM SELECTOR PLAN 6
-wife, Ann, very involved in 's care. Work #: [phone]  -Comfort care, no that cause the patient distress or prolong suffering   -No Bipap, no hi franck, no HD, no tube feeds  -wife wants pt to be discharged to [address] [place] [address] with palliative care, would like to avoid [facility] placement   -wife unable to care for pt at home  -covid positive as of 2/2

## 2021-02-04 DIAGNOSIS — F03.91 UNSPECIFIED DEMENTIA WITH BEHAVIORAL DISTURBANCE: ICD-10-CM

## 2021-02-04 LAB
ALBUMIN SERPL ELPH-MCNC: 3.4 G/DL — SIGNIFICANT CHANGE UP (ref 3.3–5)
ALP SERPL-CCNC: 103 U/L — SIGNIFICANT CHANGE UP (ref 40–120)
ALT FLD-CCNC: 142 U/L — HIGH (ref 10–45)
ANION GAP SERPL CALC-SCNC: 14 MMOL/L — SIGNIFICANT CHANGE UP (ref 5–17)
AST SERPL-CCNC: 71 U/L — HIGH (ref 10–40)
BILIRUB SERPL-MCNC: 0.6 MG/DL — SIGNIFICANT CHANGE UP (ref 0.2–1.2)
BUN SERPL-MCNC: 31 MG/DL — HIGH (ref 7–23)
CALCIUM SERPL-MCNC: 9 MG/DL — SIGNIFICANT CHANGE UP (ref 8.4–10.5)
CHLORIDE SERPL-SCNC: 118 MMOL/L — HIGH (ref 96–108)
CO2 SERPL-SCNC: 25 MMOL/L — SIGNIFICANT CHANGE UP (ref 22–31)
CREAT SERPL-MCNC: 0.75 MG/DL — SIGNIFICANT CHANGE UP (ref 0.5–1.3)
GLUCOSE SERPL-MCNC: 107 MG/DL — HIGH (ref 70–99)
POTASSIUM SERPL-MCNC: 4.2 MMOL/L — SIGNIFICANT CHANGE UP (ref 3.5–5.3)
POTASSIUM SERPL-SCNC: 4.2 MMOL/L — SIGNIFICANT CHANGE UP (ref 3.5–5.3)
PROT SERPL-MCNC: 6.8 G/DL — SIGNIFICANT CHANGE UP (ref 6–8.3)
SARS-COV-2 RNA SPEC QL NAA+PROBE: DETECTED
SODIUM SERPL-SCNC: 157 MMOL/L — HIGH (ref 135–145)

## 2021-02-04 PROCEDURE — 99233 SBSQ HOSP IP/OBS HIGH 50: CPT | Mod: CS

## 2021-02-04 PROCEDURE — 99233 SBSQ HOSP IP/OBS HIGH 50: CPT | Mod: GC

## 2021-02-04 RX ORDER — QUETIAPINE FUMARATE 200 MG/1
100 TABLET, FILM COATED ORAL AT BEDTIME
Refills: 0 | Status: DISCONTINUED | OUTPATIENT
Start: 2021-02-04 | End: 2021-02-09

## 2021-02-04 RX ORDER — QUETIAPINE FUMARATE 200 MG/1
50 TABLET, FILM COATED ORAL DAILY
Refills: 0 | Status: DISCONTINUED | OUTPATIENT
Start: 2021-02-04 | End: 2021-02-09

## 2021-02-04 RX ORDER — SODIUM CHLORIDE 9 MG/ML
1000 INJECTION, SOLUTION INTRAVENOUS
Refills: 0 | Status: DISCONTINUED | OUTPATIENT
Start: 2021-02-04 | End: 2021-02-05

## 2021-02-04 RX ORDER — ACETAMINOPHEN 500 MG
650 TABLET ORAL ONCE
Refills: 0 | Status: COMPLETED | OUTPATIENT
Start: 2021-02-04 | End: 2021-02-04

## 2021-02-04 RX ADMIN — QUETIAPINE FUMARATE 100 MILLIGRAM(S): 200 TABLET, FILM COATED ORAL at 08:41

## 2021-02-04 RX ADMIN — SODIUM CHLORIDE 50 MILLILITER(S): 9 INJECTION, SOLUTION INTRAVENOUS at 23:11

## 2021-02-04 RX ADMIN — QUETIAPINE FUMARATE 100 MILLIGRAM(S): 200 TABLET, FILM COATED ORAL at 22:34

## 2021-02-04 RX ADMIN — POLYETHYLENE GLYCOL 3350 17 GRAM(S): 17 POWDER, FOR SOLUTION ORAL at 08:41

## 2021-02-04 RX ADMIN — Medication 650 MILLIGRAM(S): at 08:41

## 2021-02-04 RX ADMIN — OLANZAPINE 2.5 MILLIGRAM(S): 15 TABLET, FILM COATED ORAL at 19:20

## 2021-02-04 RX ADMIN — Medication 3 MILLIGRAM(S): at 22:34

## 2021-02-04 NOTE — PROGRESS NOTE ADULT - PROBLEM SELECTOR PLAN 5
Hx of bipolar and schizophrenia. Had been taking seroquel 25mg prn outpatient.   - Psych consulted, reccomended seroquel 100mg BID  - enhanced supervision

## 2021-02-04 NOTE — PROGRESS NOTE ADULT - PROBLEM SELECTOR PLAN 2
BMI 16.9. Eating very little inpatient and is not improving much. Reportedly was ambulating well at assisted living facility.  - 1/29 made comfort care. MEWS exempt  - Palliative consulted  - DNR/DNI  - Patient refusing to complete speech and swallow evaluation; recommend thin liquids and mechanical soft diet  - Refused PT    #Agitation. Patient appears uncomfortable  - Refusing all of Dementia, Bipolar, and schizophrenia meds  - Psych consulted, recommend seroquel 100 BID and olanzapine IM 2.5 q8 PRN. Received 1 dose overnight with improvement.

## 2021-02-04 NOTE — PROGRESS NOTE ADULT - PROBLEM SELECTOR PLAN 3
Pt incidentally found to have COVID-19 PCR+ on presentation. No need for supplemental oxygen at this time. CT chest showing subtle GGO in RML on CT chest.   - trend daily CRP, ferritin, d-dimer  - DVT ppx with lovenox  - pending return to group home vs [facility]- needs negative COVID swab x2 for group home  - swabbed today, results pending

## 2021-02-04 NOTE — PROGRESS NOTE ADULT - PROBLEM SELECTOR PLAN 2
hx LB dementia. at [facility] pt receives Seroquel 25 qHS. pt combative, agitated with all care, med passes this admission, but calm when left alone. likely exacerbated by social isolation, unfamiliar environment, hyperactive delirium, confusion when interacting with staff wearing PPE. wife concerned for rapid increase of seroquel while inpatient.   -psych recommending: Seroquel 100mg PO BID, Zyprexa 2.5 mg IV q8 PRN  -seroquel increased from 25mg PO BID, to 50 mg BID, to 100 mg BID in 24 hr period  -would recommend seroquel 50 mg PO qAM and 100mg PO qHS  -recommend ativan 1 mg PO q8 PRN agitation   -recommend administering ativan 1 mg prior to all hygiene and AM care  -recommend more frequent use of PRN ativan or per psych recs, latricereza, prior to increasing seroquel to 100 mg PO BID  -haldol contraindicated, neuroleptic malignant syndrome

## 2021-02-04 NOTE — PROGRESS NOTE ADULT - PROBLEM SELECTOR PLAN 3
nonverbal with behaviors at bl  -pt likely is no longer appropriate for discharge back to [facility] given increased needs  -wife Ann unable to care for pt at home  -comfort care nonverbal with behaviors at bl  -pt likely is no longer appropriate for discharge back to [facility] given increased needs  -wife Ann unable to care for pt at home  -comfort care, does not qualify for home or inpatient hospice

## 2021-02-04 NOTE — PROGRESS NOTE ADULT - SUBJECTIVE AND OBJECTIVE BOX
SUBJECTIVE:    OVERNIGHT EVENTS:    DNR in chart:    ROS:  DYSPNEA: 	  NAUS/VOM: 	  SECRETIONS: 	  AGITATION:   CONSTIPATION:   Pain:  -Provocation  -Palliation:  -Quality/Quantity:  -Radiating:  -Severity:  -Timing/Frequency:  -Impact on ADLs:    OTHER REVIEW OF SYSTEMS: negative unless specified above.  UNABLE TO OBTAIN  due to:    PEx:  T(C): 35.8 (02-04-21 @ 09:00), Max: 36.4 (02-04-21 @ 05:40)  HR: 64 (02-04-21 @ 05:40) (64 - 89)  BP: 126/87 (02-04-21 @ 05:40) (126/87 - 146/99)  RR: 18 (02-04-21 @ 05:40) (18 - 20)  SpO2: 95% (02-04-21 @ 05:40) (94% - 95%)  Wt(kg): --    HEENT:  atraumatic, nonicteric sclera  Neck: no central lines, supple  CVS: s1,s2  Resp:   GI:    :    Musc:     Neuro:   Psych:     Skin:  Lymph:    	     ALLERGIES: No Known Allergies      OPIATE NAÏVE (Y/N):    MEDICATIONS: REVIEWED  MEDICATIONS  (STANDING):  melatonin 3 milliGRAM(s) Oral at bedtime  polyethylene glycol 3350 17 Gram(s) Oral every 12 hours  QUEtiapine 100 milliGRAM(s) Oral two times a day  senna 2 Tablet(s) Oral at bedtime    MEDICATIONS  (PRN):  OLANZapine Injectable 2.5 milliGRAM(s) IntraMuscular every 8 hours PRN agitation    LABS: REVIEWED  CBC:                        14.4   2.60  )-----------( 211      ( 02 Feb 2021 12:40 )             46.0     CMP:    02-02    150<H>  |  119<H>  |  29<H>  ----------------------------<  155<H>  3.6   |  27  |  0.79    Ca    8.9      02 Feb 2021 12:40  Phos  3.0     02-02  Mg     2.3     02-02    TPro  6.5  /  Alb  3.2<L>  /  TBili  0.4  /  DBili  x   /  AST  68<H>  /  ALT  95<H>  /  AlkPhos  83  02-02  Albumin, Serum: 3.2 g/dL (02-02-21 @ 12:40)      IMAGING: REVIEWED    ADVANCED DIRECTIVES:            FULL CODE            DNR DNI            MOLST    DECISION MAKER: The patient is able to participate in complex medical decision making conversations.   LEGAL SURROGATE:    What is most important to patient:  What worries patient the most:  Is patient at peace?:        GOALS OF CARE DISCUSSION       Palliative care info and support provided	           Advanced Directives addressed 	           Agency discussed:       Documentation of GOC:    PSYCHOSOCIAL-SPIRITUAL ASSESSMENT: Reviewed, Care plan unchanged   SUBJECTIVE: Due to public health emergency during pandemic and requirements to limit consultant interaction, please refer to primary team note for physical exam, ROS    wife concerned about rapid increase of seroquel.     DNR in chart: yes    PEx:  T(C): 35.8 (02-04-21 @ 09:00), Max: 36.4 (02-04-21 @ 05:40)  HR: 64 (02-04-21 @ 05:40) (64 - 89)  BP: 126/87 (02-04-21 @ 05:40) (126/87 - 146/99)  RR: 18 (02-04-21 @ 05:40) (18 - 20)  SpO2: 95% (02-04-21 @ 05:40) (94% - 95%)  Wt(kg): --    ALLERGIES: No Known Allergies      OPIATE NAÏVE (Y/N): Y    MEDICATIONS: REVIEWED  MEDICATIONS  (STANDING):  melatonin 3 milliGRAM(s) Oral at bedtime  polyethylene glycol 3350 17 Gram(s) Oral every 12 hours  QUEtiapine 100 milliGRAM(s) Oral two times a day  senna 2 Tablet(s) Oral at bedtime    MEDICATIONS  (PRN):  OLANZapine Injectable 2.5 milliGRAM(s) IntraMuscular every 8 hours PRN agitation    LABS: REVIEWED  CBC:                        14.4   2.60  )-----------( 211      ( 02 Feb 2021 12:40 )             46.0     CMP:    02-02    150<H>  |  119<H>  |  29<H>  ----------------------------<  155<H>  3.6   |  27  |  0.79    Ca    8.9      02 Feb 2021 12:40  Phos  3.0     02-02  Mg     2.3     02-02    TPro  6.5  /  Alb  3.2<L>  /  TBili  0.4  /  DBili  x   /  AST  68<H>  /  ALT  95<H>  /  AlkPhos  83  02-02  Albumin, Serum: 3.2 g/dL (02-02-21 @ 12:40)      IMAGING: REVIEWED    ADVANCE DIRECTIVES  -DNR/DNI    Decision maker: The patient is UNable to participate in complex medical decision making conversations.   Legal surrogate: wife, Ann, #[phone] or #[phone]    GOALS OF CARE DISCUSSION  -Comfort care, no interventions that cause the patient distress or prolong suffering   -wife involved with [employer] Cho[employer] society to guide advance care planning decisions  -wife wants pt to be discharged to [address] [place] [address] Residence with palliative care, would like to avoid [facility] placement   -wife unable to care for pt at home

## 2021-02-04 NOTE — PROGRESS NOTE ADULT - PROBLEM SELECTOR PLAN 6
+pcr on admission. CT chest 1/27 cw GGO in RML   -comfortable on RA  -appreciate primary team medical management  -positive swab 2/3

## 2021-02-04 NOTE — PROGRESS NOTE ADULT - PROBLEM SELECTOR PLAN 5
-bilateral LL consolidations cf aspiration pna on chest CT 1/27  -ss recs as above  -no antibiotics if administration and work up causes pt distress

## 2021-02-04 NOTE — PROGRESS NOTE ADULT - PROBLEM SELECTOR PLAN 9
-geriatric male with covid, progressive LB dementia with behaviors, poor po intake, aspiration risk, risk for readmission  -symptom management and GOC as above  -will continue to follow with you -geriatric male with covid, progressive LB dementia with behaviors, poor po intake, aspiration risk, risk for readmission  -does not qualify for inpatient or home hospice  -symptom management and GOC as above  -will continue to follow with you

## 2021-02-04 NOTE — PROGRESS NOTE ADULT - PROBLEM SELECTOR PLAN 1
controlled. CTAP on admission cw large rectal stool burden, stercoral proctitis. sp disimpaction. Fecal incontinence 2/4  -standing BR  -Miralax qAM if pt tolerates / doesn't refuse  -Senna 2 tabs qHS hold for loose stool

## 2021-02-04 NOTE — PROGRESS NOTE ADULT - ASSESSMENT
79 yo M PMHx LB dementia with behaviors, bipolar disorder, schizophrenia, PSHx of prostatectomy, who presented 1/27 from [address] [place] [address] to ED with abdominal, back pain, found to have stool burden s/p disimpaction, probable stercoral proctitis, and COVID+  .  Palliative Care following for supportive care.

## 2021-02-04 NOTE — PROGRESS NOTE ADULT - PROBLEM SELECTOR PLAN 1
Pt presents with 1 week of abdominal and lower back pain. Found to have stool impaction, s/p disimpaction by surgery in the ED. No acute surgical intervention and no abx recommended by surgery at this time.  - 3 BM's on admission 1/28   - c/w bowel regimen with miralax and senna, water enema on 2/2  - s/p IV CTX 1g x1, IV flagyl 500mg x1, IV vanc 1500mg x1 in the ED  - pt afebrile, no leukocytosis and no signs of active infection  - Has not received further antibiotics

## 2021-02-04 NOTE — PROGRESS NOTE ADULT - PROBLEM SELECTOR PLAN 9
Plan:   F: none  E: replete K<4, Mg<2  N: Regular diet  VTE Prophylaxis: lovenox  C: DNR/DNI  D: 4Lach

## 2021-02-04 NOTE — PROGRESS NOTE ADULT - SUBJECTIVE AND OBJECTIVE BOX
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 Per aide upon arrival to ED, the patient has been groaning more frequently and points to his abdomen. Last BM was on 1/26th and no blood noted in BMs. Was disimpacted in the ED. Never required oxygen. Pending negative COVID swab to be dc'ed back to nursing home. Patient has not adjusted well to being in the hospital. He is scared in the new environment, agitated, and sometimes not willing to take his seroquel. Psych consulted 2/3, recommend increase to Seroquel 100 BID and IM olazapine 2.5 q8 PRN agitation. Of note, last BM was on the day of admission. Giving senna and miralax daily, but patient has been refusing enema or suppository. Palliative consulted, DNR/DNI and comfort measures. Found to be hypernatremic likely 2/2 poor PO intake, d/cheryl for comfort. Patient pending COVID negative swab for placement back to facility.     OVERNIGHT EVENTS: DEENA. Temp of 101 this AM that resolved with tylenol.     MEDICATIONS  (STANDING):  melatonin 3 milliGRAM(s) Oral at bedtime  polyethylene glycol 3350 17 Gram(s) Oral every 12 hours  QUEtiapine 100 milliGRAM(s) Oral two times a day  senna 2 Tablet(s) Oral at bedtime    MEDICATIONS  (PRN):  OLANZapine Injectable 2.5 milliGRAM(s) IntraMuscular every 8 hours PRN agitation    I&O's Summary    02 Feb 2021 07:01  -  03 Feb 2021 07:00  --------------------------------------------------------  IN: 600 mL / OUT: 0 mL / NET: 600 mL    PHYSICAL EXAM:  Vital Signs Last 24 Hrs  T(C): 36.4 (03 Feb 2021 09:30), Max: 36.6 (02 Feb 2021 12:31)  T(F): 97.6 (03 Feb 2021 09:30), Max: 97.8 (02 Feb 2021 12:31)  HR: 75 (03 Feb 2021 09:30) (60 - 89)  BP: 115/70 (03 Feb 2021 09:30) (115/70 - 150/85)  RR: 18 (03 Feb 2021 09:30) (18 - 20)  SpO2: 96% (03 Feb 2021 09:30) (94% - 96%)    General: Thin elderly man  HEENT: NC/AT; PERRL, anicteric sclera; MMM  Neck: supple  Cardiovascular: +S1/S2; RRR  Respiratory: CTA B/L; no W/R/R  Gastrointestinal: soft, NT/ND; +BSx4  Extremities: WWP; no edema, clubbing or cyanosis  Vascular: 2+ radial, DP/PT pulses B/L  Neurological: AAOx0, combative    LABS:                        14.4   2.60  )-----------( 211      ( 02 Feb 2021 12:40 )             46.0     02-02    150<H>  |  119<H>  |  29<H>  ----------------------------<  155<H>  3.6   |  27  |  0.79    Ca    8.9      02 Feb 2021 12:40  Phos  3.0     02-02  Mg     2.3     02-02    TPro  6.5  /  Alb  3.2<L>  /  TBili  0.4  /  DBili  x   /  AST  68<H>  /  ALT  95<H>  /  AlkPhos  83  02-02    COVID-19 PCR: Positive (03 Feb 2021 18:41)  COVID-19 PCR: Positive (02 Feb 2021 11:02)  COVID-19 PCR: Positive (01 Feb 2021 11:39)  COVID-19 PCR: Positive (29 Jan 2021 17:18)  COVID-19 PCR: Detected (27 Jan 2021 14:31)    RADIOLOGY & ADDITIONAL TESTS: Reviewed.

## 2021-02-05 LAB — SARS-COV-2 RNA SPEC QL NAA+PROBE: DETECTED

## 2021-02-05 PROCEDURE — 99233 SBSQ HOSP IP/OBS HIGH 50: CPT | Mod: GC

## 2021-02-05 PROCEDURE — 99233 SBSQ HOSP IP/OBS HIGH 50: CPT | Mod: CS

## 2021-02-05 RX ADMIN — QUETIAPINE FUMARATE 50 MILLIGRAM(S): 200 TABLET, FILM COATED ORAL at 10:51

## 2021-02-05 NOTE — PROGRESS NOTE ADULT - PROBLEM SELECTOR PLAN 1
controlled. CTAP on admission cw large rectal stool burden, stercoral proctitis. sp disimpaction. Fecal incontinence 2/5  -standing BR  -Miralax qAM if pt tolerates / doesn't refuse  -Senna 2 tabs qHS hold for loose stool

## 2021-02-05 NOTE — CHART NOTE - NSCHARTNOTEFT_GEN_A_CORE
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 Also found to have COVID. During the hospitalization, the patient has had a precipitous decline in cognitive and functional status, requiring increasing doses of Seroquel for agitation, without significant effect. After discussions with patients wife, and based on patients previous wishes, patients goals are now comfort based. Currently, the patients is bedbound, somnolent, and no longer interactive. No viable oral route exists as patient is only intermittently able to take medications. Sodium levels continue to climb, signaling dehydation and further blood work is going to be discontinued based on comfort care approach. Comfort care is appropriate. At this point in time, FAST score is 7C and patient is a DNR. Hospice care seems appropriate as anticipated prognosis is days to weeks.
Admitting Diagnosis:   Patient is a 80y old  Male who presents with a chief complaint of Stercoral colitis, COVID+ (2021 15:49)    PAST MEDICAL & SURGICAL HISTORY:  Schizophrenia, acute undifferentiated    Bipolar 1 disorder    Insomnia    Dementia    Alzheimer disease    No significant past surgical history    Current Nutrition Order:  mechanical soft/thin liquids  Ensure Enlive BID (each 350kcal/20gpro)    PO Intake: Good (%) [  ]  Fair (50-75%) [  ] Poor (<25%) [ x ]    GI Issues:   Pt with stool burden s/p disimpaction in ED, 3BMs   On miralax, senna    Pain:  Per flowsheet, nonverbal pain indicators absent    Skin Integrity:  No edema noted    Labs:       150<H>  |  119<H>  |  29<H>  ----------------------------<  155<H>  3.6   |  27  |  0.79    Ca    8.9      2021 12:40  Phos  3.0       Mg     2.3         TPro  6.5  /  Alb  3.2<L>  /  TBili  0.4  /  DBili  x   /  AST  68<H>  /  ALT  95<H>  /  AlkPhos  83      CAPILLARY BLOOD GLUCOSE    Medications:  MEDICATIONS  (STANDING):  dextrose 5%. 1000 milliLiter(s) (50 mL/Hr) IV Continuous <Continuous>  melatonin 3 milliGRAM(s) Oral at bedtime  polyethylene glycol 3350 17 Gram(s) Oral every 12 hours  QUEtiapine 50 milliGRAM(s) Oral every 12 hours  senna 2 Tablet(s) Oral at bedtime    MEDICATIONS  (PRN):    Admission Anthropometrics:  Weight Assessment:  · Height for BMI (FEET)	5 Feet  · Height for BMI (INCHES)	9 Inch(s)  · Height for BMI (CENTIMETERS)	175.26 Centimeter(s)  · Weight for BMI (lbs)	114 lb  · Weight for BMI (kg)	51.7 kg  · Body Mass Index	16.8  · Ideal Body Weight (lbs)	160  · Ideal Body Weight (kg)	72.5    Weight Change: No new wts to assess    Estimated energy needs:   IBW used for calculations as pt 71% of IBW, adjusted for advanced age, hypermetabolic state, fluids per team  25-30kcal/k-2175kcal  1.2-1.4g/k-102gprotein    Subjective:   81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 Also found to be COVID+      Unable to conduct a face to face interview or nutrition-focused physical exam due to limited contact restrictions related to the pt's medical condition and isolation precautions. S/p failed swallow bedside assessment , per SLP f/u "given increased confusion on adm to [facility] & combative behavior w food refusal on earlier attempt at CSE, would recommend initiating a [facility] soft/thin liq diet. Allow Pt to feed himself if he's willing, otherwise, small bites/sips, do not force feed, seat upright, provide PO multiple times throughout day" Diet now advanced to mechanical soft/thin liquids +ONS. Per palliative note , food for comfort as tolerated by pt. Will continue to align nutrition with GOC.     Patients with goals of care identified as “Comfort Measures Only” as a Patient Care Order or documented in medical provider notes, can be determined at low complexity and follow-up by the dietitian by consult only.     Previous Nutrition Diagnosis:   Underweight RT suspected inadequate energy intake in setting of catabolic illness AEB BMI 16.9, 71% of IBW    Active [ x  ]  Resolved [   ]    If resolved, new PES:     Goal: Keep nutrition aligned with GOC     Recommendations:  1. Continue mechanical soft/thin liquids +ONS as per SLP  2. Keep nutrition aligned with GOC  3. Pain and bowel regimens per team    Education: N/A    Risk Level: High [   ] Moderate [   ] Low [  x ]

## 2021-02-05 NOTE — PROGRESS NOTE ADULT - ASSESSMENT
79 yo M PMHx LB dementia with behaviors, bipolar disorder, schizophrenia, PSHx of prostatectomy, who presented 1/27 from [address] [place] [address] to ED with abdominal, back pain, found to have stool burden s/p disimpaction, probable stercoral proctitis, and COVID+  .  Palliative Care following for supportive care.  81 yo M PMHx LB dementia with behaviors, bipolar disorder, schizophrenia, PSHx of prostatectomy, who presented 1/27 from [address] [place] [address] to ED with abdominal, back pain, found to have stool burden s/p disimpaction, probable stercoral proctitis, and COVID+  .  Palliative Care following for supportive care and assistance with complicated dispo +/- hospice.

## 2021-02-05 NOTE — PROGRESS NOTE ADULT - PROBLEM SELECTOR PLAN 2
-BMI 16.9. Eating very little inpatient and is not improving much. Reportedly was ambulating well at assisted living facility.  - 1/29 made comfort care. MEWS exempt  - Palliative consulted: Made comfort care after discussion with family  - DNR/DNI  - Patient refusing to complete speech and swallow evaluation; recommend thin liquids and mechanical soft diet  - Refused PT    #Agitation. Patient appears uncomfortable  - Refusing all of Dementia, Bipolar, and schizophrenia meds  - Psych consulted, recommend seroquel 100 BID and olanzapine IM 2.5 q8 PRN

## 2021-02-05 NOTE — PROGRESS NOTE ADULT - SUBJECTIVE AND OBJECTIVE BOX
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 Per aide upon arrival to ED, the patient has been groaning more frequently and points to his abdomen. Last BM was on 1/26th and no blood noted in BMs. Was disimpacted in the ED. Never required oxygen. Pending negative COVID swab to be dc'ed back to nursing home. Patient has not adjusted well to being in the hospital. He is scared in the new environment, agitated, and sometimes not willing to take his seroquel. Psych consulted 2/3, recommend increase to Seroquel 100 BID and IM olazapine 2.5 q8 PRN agitation. Of note, last BM was on the day of admission, initially giving senna and miralax daily (when pt did not refuse), however discontinued on 2/5 in light of diarrhea overnight. Palliative consulted, DNR/DNI and comfort measures agreed upon w/ family. Found to be hypernatremic likely 2/2 poor PO intake, d/cheryl for comfort. Patient pending negative COVID negative swab x2 for placement to [facility] (accepted) if wife is in agreement, if not may require 24hr notice.      SUBJECTIVE / OVERNIGHT EVENTS: Patient seen and examined at the bedside. Difficult to communicate w/ in light of dementia Pt did endorse that he felt ok and had no complaints at this time. All other ROS negative except those listed in subjective assessment.    ADDITIONAL REVIEW OF SYSTEMS:    MEDICATIONS  (STANDING):  melatonin 3 milliGRAM(s) Oral at bedtime  QUEtiapine 50 milliGRAM(s) Oral daily  QUEtiapine 100 milliGRAM(s) Oral at bedtime    MEDICATIONS  (PRN):  OLANZapine Injectable 2.5 milliGRAM(s) IntraMuscular every 8 hours PRN agitation    CAPILLARY BLOOD GLUCOSE    I&O's Summary    04 Feb 2021 07:01  -  05 Feb 2021 07:00  --------------------------------------------------------  IN: 150 mL / OUT: 0 mL / NET: 150 mL      PHYSICAL EXAM:  Vital Signs Last 24 Hrs  T(C): 36.1 (05 Feb 2021 09:00), Max: 37.2 (04 Feb 2021 12:52)  T(F): 97 (05 Feb 2021 09:00), Max: 98.9 (04 Feb 2021 12:52)  HR: 84 (05 Feb 2021 09:00) (84 - 88)  BP: 118/77 (05 Feb 2021 09:00) (118/77 - 162/91)  BP(mean): --  RR: 19 (05 Feb 2021 09:00) (18 - 19)  SpO2: 100% (05 Feb 2021 10:57) (89% - 100%)      CONSTITUTIONAL: NAD, well-developed, well-groomed  RESPIRATORY: Normal respiratory effort; lungs are clear to auscultation bilaterally  CARDIOVASCULAR: Regular rate and rhythm, normal S1 and S2, no murmur/rub/gallop; No lower extremity edema; Peripheral pulses are 2+ bilaterally  ABDOMEN: Nontender to palpation, normoactive bowel sounds, no rebound/guarding; No hepatosplenomegaly  PSYCH: A+O x0-1 (confused however responds to commands)      LABS:    02-04    157<H>  |  118<H>  |  31<H>  ----------------------------<  107<H>  4.2   |  25  |  0.75    Ca    9.0      04 Feb 2021 20:32    TPro  6.8  /  Alb  3.4  /  TBili  0.6  /  DBili  x   /  AST  71<H>  /  ALT  142<H>  /  AlkPhos  103  02-04      COVID-19 PCR: Detected (04 Feb 2021 09:09)  COVID-19 PCR: Positive (03 Feb 2021 18:41)  COVID-19 PCR: Positive (02 Feb 2021 11:02)  COVID-19 PCR: Positive (01 Feb 2021 11:39)  COVID-19 PCR: Positive (29 Jan 2021 17:18)  COVID-19 PCR: Detected (27 Jan 2021 14:31)

## 2021-02-05 NOTE — PROGRESS NOTE ADULT - PROBLEM SELECTOR PLAN 1
-Pt presents with 1 week of abdominal and lower back pain. Found to have stool impaction, s/p disimpaction by surgery in the ED. No acute surgical intervention and no abx recommended by surgery at this time.  - 3 BM's on admission 1/28   -s/p enema on 2/2 and bowel regimen of miralax and senna discontinued on 2/5 secondary to diarrhea overnight  - s/p IV CTX 1g x1, IV flagyl 500mg x1, IV vanc 1500mg x1 in the ED  - pt afebrile, no leukocytosis and no signs of active infection  - Has not received further antibiotics

## 2021-02-05 NOTE — PROGRESS NOTE ADULT - PROBLEM SELECTOR PLAN 3
nonverbal with behaviors prior to admission   -this admission pt w significant decline in functional status, no longer eating, worsening behaviors  -goc as below

## 2021-02-05 NOTE — PROGRESS NOTE ADULT - PROBLEM SELECTOR PLAN 2
hx LB dementia. Seroquel 25 qHS at [facility]. pt combative, agitated with all care, med passes this admission, but calm when left alone. likely exacerbated by social isolation, unfamiliar environment, hyperactive delirium, confusion when interacting with staff wearing PPE.   -sp psych consult  -seroquel increased from 25mg PO BID, to 50 mg BID, to 100 mg BID in 24 hr period 2/1, wife concerned for rapid titration   -cw seroquel 50 mg PO qAM and 100mg PO qHS  -ativan 1 mg PO q8 PRN agitation   -recommend administering ativan 1 mg prior to all hygiene and AM care  -recommend more frequent use of PRN ativan or per psych recs, zypreza  -no haldol, neuroleptic malignant syndrome hx LB dementia. Seroquel 25 qHS at [facility]. pt combative, agitated with all care, med passes this admission, but calm when left alone. likely exacerbated by social isolation, unfamiliar environment, hyperactive delirium, confusion when interacting with staff wearing PPE.   -sp psych consult  -seroquel increased from 25mg PO BID, to 50 mg BID, to 100 mg BID in 24 hr period 2/1, wife concerned for rapid titration   -cw seroquel 50 mg PO qAM and 100mg PO qHS  -ativan 1 mg PO q8 PRN agitation   -recommend administering ativan 1 mg prior to all hygiene and AM care  -recommend more frequent use of PRN ativan or per psych recs, zyprexa  -no haldol, neuroleptic malignant syndrome

## 2021-02-05 NOTE — PROGRESS NOTE ADULT - PROBLEM SELECTOR PLAN 5
Hx of bipolar and schizophrenia. Had been taking Seroquel 25mg prn outpatient.   - Psych consulted, recommended Seroquel 100mg BID  - enhanced supervision

## 2021-02-05 NOTE — PROGRESS NOTE ADULT - ASSESSMENT
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 Also found to be COVID+. Currently on comfort care after discussion between family and Palliative. Awaiting COVID negative swab x2 to be d/c to [facility] however family refusing at this time.

## 2021-02-05 NOTE — PROGRESS NOTE ADULT - PROBLEM SELECTOR PLAN 3
Pt incidentally found to have COVID-19 PCR+ on presentation. No need for supplemental oxygen at this time. CT chest showing subtle GGO in RML on CT chest.   - DVT ppx with lovenox  - pending return to [facility] vs [facility]- needs negative COVID swab x2 for intermediate. Accepted to [facility] but family refusing at this time. May require 24hr notice  -COVID swabbed 2/5; f/u results

## 2021-02-05 NOTE — PROGRESS NOTE ADULT - PROBLEM SELECTOR PLAN 6
+pcr on admission. CT chest 1/27 cw GGO in RML   -sats ok, comfortable on RA  -appreciate primary team medical management  -positive swab 2/4

## 2021-02-05 NOTE — PROGRESS NOTE ADULT - SUBJECTIVE AND OBJECTIVE BOX
SUBJECTIVE: Due to public health emergency during pandemic and requirements to limit consultant interaction, please refer to primary team note for physical exam, ROS    DNR in chart: Yes    PEx:  T(C): 36.1 (02-05-21 @ 09:00), Max: 36.4 (02-04-21 @ 17:45)  HR: 84 (02-05-21 @ 09:00) (84 - 88)  BP: 118/77 (02-05-21 @ 09:00) (118/77 - 162/91)  RR: 19 (02-05-21 @ 09:00) (18 - 19)  SpO2: 100% (02-05-21 @ 10:57) (89% - 100%)  Wt(kg): -- 	     ALLERGIES: No Known Allergies    OPIATE NAÏVE (Y/N): Yes    MEDICATIONS: REVIEWED  MEDICATIONS  (STANDING):  melatonin 3 milliGRAM(s) Oral at bedtime  QUEtiapine 50 milliGRAM(s) Oral daily  QUEtiapine 100 milliGRAM(s) Oral at bedtime    MEDICATIONS  (PRN):  OLANZapine Injectable 2.5 milliGRAM(s) IntraMuscular every 8 hours PRN agitation    LABS: REVIEWED  CBC:    CMP:    02-04    157<H>  |  118<H>  |  31<H>  ----------------------------<  107<H>  4.2   |  25  |  0.75    Ca    9.0      04 Feb 2021 20:32    TPro  6.8  /  Alb  3.4  /  TBili  0.6  /  DBili  x   /  AST  71<H>  /  ALT  142<H>  /  AlkPhos  103  02-04  Albumin, Serum: 3.4 g/dL (02-04-21 @ 20:32)      IMAGING: REVIEWED    ADVANCED DIRECTIVES:            FULL CODE            DNR DNI            MOLST    DECISION MAKER: The patient is able to participate in complex medical decision making conversations.   LEGAL SURROGATE:    What is most important to patient:  What worries patient the most:  Is patient at peace?:        GOALS OF CARE DISCUSSION       Palliative care info and support provided	           Advanced Directives addressed 	           Agency discussed:       Documentation of GOC:    PSYCHOSOCIAL-SPIRITUAL ASSESSMENT: Reviewed, Care plan unchanged   SUBJECTIVE: Due to public health emergency during pandemic and requirements to limit consultant interaction, please refer to primary team note for physical exam, ROS    DNR in chart: Yes    PEx:  T(C): 36.1 (02-05-21 @ 09:00), Max: 36.4 (02-04-21 @ 17:45)  HR: 84 (02-05-21 @ 09:00) (84 - 88)  BP: 118/77 (02-05-21 @ 09:00) (118/77 - 162/91)  RR: 19 (02-05-21 @ 09:00) (18 - 19)  SpO2: 100% (02-05-21 @ 10:57) (89% - 100%)  Wt(kg): -- 	     ALLERGIES: No Known Allergies    OPIATE NAÏVE (Y/N): Yes    MEDICATIONS: REVIEWED  MEDICATIONS  (STANDING):  melatonin 3 milliGRAM(s) Oral at bedtime  QUEtiapine 50 milliGRAM(s) Oral daily  QUEtiapine 100 milliGRAM(s) Oral at bedtime    MEDICATIONS  (PRN):  OLANZapine Injectable 2.5 milliGRAM(s) IntraMuscular every 8 hours PRN agitation    LABS: REVIEWED  CBC:    CMP:    02-04    157<H>  |  118<H>  |  31<H>  ----------------------------<  107<H>  4.2   |  25  |  0.75    Ca    9.0      04 Feb 2021 20:32    TPro  6.8  /  Alb  3.4  /  TBili  0.6  /  DBili  x   /  AST  71<H>  /  ALT  142<H>  /  AlkPhos  103  02-04  Albumin, Serum: 3.4 g/dL (02-04-21 @ 20:32)      IMAGING: REVIEWED    ADVANCE DIRECTIVES  -DNR/DNI    Decision maker: The patient is UNable to participate in complex medical decision making conversations.   Legal surrogate: wife, Ann, #[phone] or #[phone]    GOALS OF CARE DISCUSSION  -Comfort care, no interventions that cause the patient distress or prolong suffering   -wife ideally wants pt dc back to [address] [facility], however pt with significant functional decline this admission   -home hospice referrals submitted by palliative sw 2/5  -wife will need A support to care for covid + pt with worsening behaviors   -goc as below

## 2021-02-05 NOTE — PROGRESS NOTE ADULT - PROBLEM SELECTOR PLAN 9
-geriatric male with covid, progressive LB dementia with worsening behaviors, not eating   -symptom management and GOC as above  -will continue to follow with you

## 2021-02-06 LAB — SARS-COV-2 RNA SPEC QL NAA+PROBE: POSITIVE

## 2021-02-06 PROCEDURE — 99233 SBSQ HOSP IP/OBS HIGH 50: CPT | Mod: GC

## 2021-02-06 RX ADMIN — QUETIAPINE FUMARATE 100 MILLIGRAM(S): 200 TABLET, FILM COATED ORAL at 21:31

## 2021-02-06 RX ADMIN — QUETIAPINE FUMARATE 50 MILLIGRAM(S): 200 TABLET, FILM COATED ORAL at 10:59

## 2021-02-06 RX ADMIN — Medication 3 MILLIGRAM(S): at 21:31

## 2021-02-06 NOTE — PROGRESS NOTE ADULT - PROBLEM SELECTOR PLAN 3
Pt incidentally found to have COVID-19 PCR+ on presentation. No need for supplemental oxygen at this time. CT chest showing subtle GGO in RML on CT chest.   - DVT ppx with lovenox  - pending return to [facility] vs [facility]- needs negative COVID swab x2 for nursing home. Accepted to [facility] but family refusing at this time. May require 24hr notice  -COVID swabbed 2/5 (+)

## 2021-02-06 NOTE — PROGRESS NOTE ADULT - ASSESSMENT
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 Also found to be COVID+. Currently on comfort care after discussion between family and Palliative. Awaiting COVID negative swab x2 to be d/c to [facility] however family refusing at this time.

## 2021-02-06 NOTE — PROGRESS NOTE ADULT - SUBJECTIVE AND OBJECTIVE BOX
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 Per aide upon arrival to ED, the patient has been groaning more frequently and points to his abdomen. Last BM was on 1/26th and no blood noted in BMs. Was disimpacted in the ED. Never required oxygen. Pending negative COVID swab to be dc'ed back to nursing home. Patient has not adjusted well to being in the hospital. He is scared in the new environment, agitated, and sometimes not willing to take his seroquel. Psych consulted 2/3, recommend increase to Seroquel 100 BID and IM olazapine 2.5 q8 PRN agitation. Of note, last BM was on the day of admission, initially giving senna and miralax daily (when pt did not refuse), however discontinued on 2/5 in light of diarrhea overnight. Palliative consulted, DNR/DNI and comfort measures agreed upon w/ family. Found to be hypernatremic likely 2/2 poor PO intake, d/cheryl for comfort. Patient pending negative COVID negative swab x2 for placement to [facility] (accepted) if wife is in agreement, if not may require 24hr notice.      SUBJECTIVE / OVERNIGHT EVENTS: Patient seen and examined at the bedside. was unresponsive to questions AOX0. All other ROS negative except those listed in subjective assessment.    INTERVAL HPI/OVERNIGHT EVENTS:  Patient was seen and examined at bedside. As per nurse and patient, no o/n events, patient resting comfortably. No complaints at this time. Patient denies: fever, chills, dizziness, weakness, HA, Changes in vision, CP, palpitations, SOB, cough, N/V/D/C, dysuria, changes in bowel movements, LE edema. ROS otherwise negative.    VITAL SIGNS:  T(F): 98.4 (02-06-21 @ 06:25)  HR: 88 (02-06-21 @ 06:25)  BP: 143/91 (02-06-21 @ 06:25)  RR: 16 (02-06-21 @ 06:25)  SpO2: 100% (02-06-21 @ 06:25)      MEDICATIONS  (STANDING):  melatonin 3 milliGRAM(s) Oral at bedtime  QUEtiapine 50 milliGRAM(s) Oral daily  QUEtiapine 100 milliGRAM(s) Oral at bedtime    MEDICATIONS  (PRN):  OLANZapine Injectable 2.5 milliGRAM(s) IntraMuscular every 8 hours PRN agitation      Allergies    No Known Allergies          LABS:    02-04    157<H>  |  118<H>  |  31<H>  ----------------------------<  107<H>  4.2   |  25  |  0.75    Ca    9.0      04 Feb 2021 20:32    TPro  6.8  /  Alb  3.4  /  TBili  0.6  /  DBili  x   /  AST  71<H>  /  ALT  142<H>  /  AlkPhos  103  02-04          RADIOLOGY & ADDITIONAL TESTS:  Reviewed    PHYSICAL EXAM:  Vital Signs Last 24 Hrs  T(C): 36.1 (05 Feb 2021 09:00), Max: 37.2 (04 Feb 2021 12:52)  T(F): 97 (05 Feb 2021 09:00), Max: 98.9 (04 Feb 2021 12:52)  HR: 84 (05 Feb 2021 09:00) (84 - 88)  BP: 118/77 (05 Feb 2021 09:00) (118/77 - 162/91)  BP(mean): --  RR: 19 (05 Feb 2021 09:00) (18 - 19)  SpO2: 100% (05 Feb 2021 10:57) (89% - 100%)      CONSTITUTIONAL: NAD, well-developed, well-groomed  RESPIRATORY: Normal respiratory effort; lungs are clear to auscultation bilaterally  CARDIOVASCULAR: Regular rate and rhythm, normal S1 and S2, no murmur/rub/gallop; No lower extremity edema; Peripheral pulses are 2+ bilaterally  ABDOMEN: Nontender to palpation, normoactive bowel sounds, no rebound/guarding; No hepatosplenomegaly  PSYCH: A+O x0(eyes open to voice, doesnt answer questions)      LABS:    02-04    157<H>  |  118<H>  |  31<H>  ----------------------------<  107<H>  4.2   |  25  |  0.75    Ca    9.0      04 Feb 2021 20:32    TPro  6.8  /  Alb  3.4  /  TBili  0.6  /  DBili  x   /  AST  71<H>  /  ALT  142<H>  /  AlkPhos  103  02-04      COVID-19 PCR: Detected (04 Feb 2021 09:09)  COVID-19 PCR: Positive (03 Feb 2021 18:41)  COVID-19 PCR: Positive (02 Feb 2021 11:02)  COVID-19 PCR: Positive (01 Feb 2021 11:39)  COVID-19 PCR: Positive (29 Jan 2021 17:18)  COVID-19 PCR: Detected (27 Jan 2021 14:31)

## 2021-02-07 LAB — SARS-COV-2 RNA SPEC QL NAA+PROBE: POSITIVE

## 2021-02-07 PROCEDURE — 99233 SBSQ HOSP IP/OBS HIGH 50: CPT | Mod: GC

## 2021-02-07 RX ADMIN — QUETIAPINE FUMARATE 50 MILLIGRAM(S): 200 TABLET, FILM COATED ORAL at 12:43

## 2021-02-07 NOTE — PROGRESS NOTE ADULT - PROBLEM SELECTOR PLAN 3
Pt incidentally found to have COVID-19 PCR+ on presentation.   2/7: 95% on RA   - DVT ppx discontinued- patient DNR/DNI  - Plan is for home with home hospice if negative swab   -COVID swabbed 2/6 (+)

## 2021-02-07 NOTE — PROGRESS NOTE ADULT - PROBLEM SELECTOR PLAN 5
Hx of bipolar and schizophrenia. Had been taking Seroquel 25mg prn outpatient.   - Psych consulted, on seroquel 50AM, 100 HS, ativan PRN   - enhanced supervision- patient less agitated

## 2021-02-07 NOTE — PROGRESS NOTE ADULT - PROBLEM SELECTOR PLAN 2
-BMI 16.9. Eating very little inpatient and is not improving much. Reportedly was ambulating well at assisted living facility.  - 1/29 made comfort care. MEWS exempt  - Palliative consulted: Made comfort care after discussion with family  - DNR/DNI  - Patient refusing to complete speech and swallow evaluation; recommend thin liquids and mechanical soft diet    #Agitation- h/o on admission   - Refusing all of Dementia, Bipolar, and schizophrenia meds  - Psych recs: c/w  seroquel 50mg daily, 100 hs and avitan PRN   - agitation improved  - patient on comfort care

## 2021-02-07 NOTE — PROGRESS NOTE ADULT - ASSESSMENT
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 Also found to be COVID+.   Currently on comfort care after discussion between family and Palliative.   Awaiting COVID negative swab x2 to be d/c to [facility] however family refusing at this time.- last positive yesterday 2/6

## 2021-02-07 NOTE — PROGRESS NOTE ADULT - PROBLEM SELECTOR PLAN 9
Plan:   N: dysphagia 2 mechanical soft thin liquids   VTE Prophylaxis: none indicated   C: DNR/DNI, currently comfortable on RA   D: [facility], pending home hospice when negative swabs obtained

## 2021-02-07 NOTE — PROGRESS NOTE ADULT - SUBJECTIVE AND OBJECTIVE BOX
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 Per aide upon arrival to ED, the patient has been groaning more frequently and points to his abdomen. Last BM was on 1/26th and no blood noted in BMs. Was disimpacted in the ED. Never required oxygen. Pending negative COVID swab to be dc'ed back to nursing home. Patient has not adjusted well to being in the hospital. He is scared in the new environment, agitated, and sometimes not willing to take his seroquel. Psych consulted 2/3, recommend increase to Seroquel 100 BID and IM olazapine 2.5 q8 PRN agitation. Of note, last BM was on the day of admission, initially giving senna and miralax daily (when pt did not refuse), however discontinued on 2/5 in light of diarrhea overnight. Palliative consulted, DNR/DNI and comfort measures agreed upon w/ family. Found to be hypernatremic likely 2/2 poor PO intake, d/cheryl for comfort. Patient pending negative COVID negative swab x2 for placement to [facility] (accepted) if wife is in agreement, if not may require 24hr notice.      SUBJECTIVE / OVERNIGHT EVENTS: Patient seen and examined at the bedside. was unresponsive to questions AOX0. Appears comfortable    INTERVAL HPI/OVERNIGHT EVENTS:  Patient was seen and examined at bedside. As per nurse and patient, no o/n events, patient resting comfortably. No complaints at this time. Patient denies: fever, chills, dizziness, weakness, HA, Changes in vision, CP, palpitations, SOB, cough, N/V/D/C, dysuria, changes in bowel movements, LE edema. ROS otherwise negative.    VITAL SIGNS:  Vital Signs Last 24 Hrs  T(C): 36.3 (07 Feb 2021 09:00), Max: 36.7 (06 Feb 2021 16:18)  T(F): 97.3 (07 Feb 2021 09:00), Max: 98 (06 Feb 2021 16:18)  HR: 85 (07 Feb 2021 09:00) (85 - 106)  BP: 137/85 (07 Feb 2021 09:00) (126/75 - 155/78)  BP(mean): --  RR: 19 (07 Feb 2021 09:00) (18 - 20)  SpO2: 95% (07 Feb 2021 09:00) (95% - 100%)      MEDICATIONS  (STANDING):  melatonin 3 milliGRAM(s) Oral at bedtime  QUEtiapine 50 milliGRAM(s) Oral daily  QUEtiapine 100 milliGRAM(s) Oral at bedtime      MEDICATIONS  (PRN):    LORazepam     Tablet 1 milliGRAM(s) Oral every 8 hours PRN Agitation        Allergies    No Known Allergies          LABS:                      RADIOLOGY & ADDITIONAL TESTS:  Reviewed    PHYSICAL EXAM:  Vital Signs Last 24 Hrs  T(C): 36.3 (07 Feb 2021 09:00), Max: 36.7 (06 Feb 2021 16:18)  T(F): 97.3 (07 Feb 2021 09:00), Max: 98 (06 Feb 2021 16:18)  HR: 85 (07 Feb 2021 09:00) (85 - 106)  BP: 137/85 (07 Feb 2021 09:00) (126/75 - 155/78)  BP(mean): --  RR: 19 (07 Feb 2021 09:00) (18 - 20)  SpO2: 95% (07 Feb 2021 09:00) (95% - 100%) on RA       CONSTITUTIONAL: NAD, no acute distress, resting comfortably, responsive to stimuli   RESPIRATORY: Normal respiratory effort; lungs are clear to auscultation bilaterally  CARDIOVASCULAR: Regular rate and rhythm, ; No lower extremity edema; Peripheral pulses are 2+ bilaterally  ABDOMEN: Nontender to palpation,  no rebound/guarding; No hepatosplenomegaly  PSYCH: non verbal       LABS:    02-04    157<H>  |  118<H>  |  31<H>  ----------------------------<  107<H>  4.2   |  25  |  0.75    Ca    9.0      04 Feb 2021 20:32    TPro  6.8  /  Alb  3.4  /  TBili  0.6  /  DBili  x   /  AST  71<H>  /  ALT  142<H>  /  AlkPhos  103  02-04    COVID-19 PCR: Positive (06 Feb 2021 08:02)  COVID-19 PCR: Detected (05 Feb 2021 10:36)  COVID-19 PCR: Detected (04 Feb 2021 09:09)  COVID-19 PCR: Positive (03 Feb 2021 18:41)  COVID-19 PCR: Positive (02 Feb 2021 11:02)  COVID-19 PCR: Positive (01 Feb 2021 11:39)  COVID-19 PCR: Positive (29 Jan 2021 17:18)  COVID-19 PCR: Detected (27 Jan 2021 14:31)

## 2021-02-08 LAB — SARS-COV-2 RNA SPEC QL NAA+PROBE: DETECTED

## 2021-02-08 PROCEDURE — 99233 SBSQ HOSP IP/OBS HIGH 50: CPT | Mod: CS

## 2021-02-08 PROCEDURE — 99233 SBSQ HOSP IP/OBS HIGH 50: CPT | Mod: GC

## 2021-02-08 RX ORDER — QUETIAPINE FUMARATE 200 MG/1
1 TABLET, FILM COATED ORAL
Qty: 0 | Refills: 0 | DISCHARGE

## 2021-02-08 RX ORDER — QUETIAPINE FUMARATE 200 MG/1
1 TABLET, FILM COATED ORAL
Qty: 30 | Refills: 0
Start: 2021-02-08 | End: 2021-03-09

## 2021-02-08 RX ORDER — LANOLIN ALCOHOL/MO/W.PET/CERES
1 CREAM (GRAM) TOPICAL
Qty: 0 | Refills: 0 | DISCHARGE

## 2021-02-08 RX ORDER — LANOLIN ALCOHOL/MO/W.PET/CERES
1 CREAM (GRAM) TOPICAL
Qty: 30 | Refills: 0
Start: 2021-02-08 | End: 2021-03-09

## 2021-02-08 RX ADMIN — Medication 3 MILLIGRAM(S): at 22:09

## 2021-02-08 RX ADMIN — QUETIAPINE FUMARATE 100 MILLIGRAM(S): 200 TABLET, FILM COATED ORAL at 22:09

## 2021-02-08 RX ADMIN — QUETIAPINE FUMARATE 50 MILLIGRAM(S): 200 TABLET, FILM COATED ORAL at 13:59

## 2021-02-08 NOTE — PROGRESS NOTE ADULT - PROBLEM SELECTOR PLAN 6
+pcr on admission. CT chest 1/27 cw GGO in RML   -sats ok, comfortable on RA  -appreciate primary team medical management  -positive swab 2/8

## 2021-02-08 NOTE — PROGRESS NOTE ADULT - PROBLEM SELECTOR PLAN 1
-Pt p/w with 1 week of abdominal and lower back pain. Found to have stool impaction, s/p disimpaction by surgery in the ED. No acute surgical intervention and no abx recommended by surgery at this time.  -s/p enema on 2/2 and bowel regimen of miralax and senna discontinued on 2/5 secondary to diarrhea overnight, resolved   - s/p IV CTX 1g x1, IV flagyl 500mg x1, IV vanc 1500mg x1 in the ED  - pt afebrile, no leukocytosis and no signs of active infection

## 2021-02-08 NOTE — PROGRESS NOTE ADULT - REASON FOR ADMISSION
Stercoral colitis, COVID+

## 2021-02-08 NOTE — PROGRESS NOTE ADULT - PROBLEM SELECTOR PROBLEM 2
Agitation due to dementia
Lewy body dementia
Severe protein-calorie malnutrition
Lewy body dementia
Agitation due to dementia
Severe protein-calorie malnutrition
Lewy body dementia
Severe protein-calorie malnutrition
Severe protein-calorie malnutrition
Agitation due to dementia
Severe protein-calorie malnutrition

## 2021-02-08 NOTE — PROGRESS NOTE ADULT - PROBLEM SELECTOR PROBLEM 1
Proctitis
Proctitis
Constipation
Proctitis
Constipation
Proctitis
Constipation
Proctitis

## 2021-02-08 NOTE — PROGRESS NOTE ADULT - PROBLEM SELECTOR PLAN 7
Hx of dementia with lewy bodies. Nonverbal at baseline. Not on any other known medications for dementia  - f/u AM official med/rec  - enhanced supervision
Hx of dementia with lewy bodies. Nonverbal at baseline. Not on any other known medications for dementia  - enhanced supervision
-wife, Ann, very involved in pts care, best reached at her work #[phone]  -No interventions that cause patient distress or prolong suffering   -No Bipap, no hi franck, no HD, no tube feeds  -food for comfort  -multiple GOC discussions with wife Ann  -Continue comfort care and dc home with [facility] and private pay hired [employer]
Autumn completed and placed in physical chart
Hx of dementia with lewy bodies. Nonverbal at baseline. Not on any other known medications for dementia  - enhanced supervision
Hx of dementia with lewy bodies. Nonverbal at baseline. Not on any other known medications for dementia  - enhanced supervision
Hx of dementia with lewy bodies. Nonverbal at baseline. Not on any other known medications for dementia  - comfort care, DNR/DNI
Hx of dementia with lewy bodies. Nonverbal at baseline. Not on any other known medications for dementia  - enhanced supervision- patient less agitated   - comfort care, DNR/DNI
-wife, Ann, very involved in pts care. Best reached at her work #[phone]  -Comfort care, no interventions that cause patient distress or prolong suffering   -No Bipap, no hi franck, no HD, no tube feeds  -wife wants pt to be discharged to [address] [place] [address] Residence with palliative care, would like to avoid [facility] placement
Autumn completed and placed in physical chart
-wife, Ann, very involved in pts care, best reached at her work #[phone]  -Comfort care, no interventions that cause patient distress or prolong suffering   -No Bipap, no hi franck, no HD, no tube feeds  -pt cannot be discharged back to [address] [place] [address] given functional decline, covid +  -wife doesn't want to send pt to [facility]  -palliative sw submitted home hospice referrals to [facility], [facility] to see if pt meets home hospice criteria   -[facility] does not qualify for inpatient hospice   -wife, Ann, may be unable to care for pt at home given worsening behaviors, will need HHA support, private pay
Hx of dementia with lewy bodies. Nonverbal at baseline. Not on any other known medications for dementia  - enhanced supervision
Autumn completed and placed in physical chart
Hx of dementia with lewy bodies. Nonverbal at baseline. Not on any other known medications for dementia  - enhanced supervision
Hx of dementia with lewy bodies. Nonverbal at baseline. Not on any other known medications for dementia  - enhanced supervision

## 2021-02-08 NOTE — PROGRESS NOTE ADULT - PROBLEM SELECTOR PROBLEM 4
Aspiration pneumonia
Aspiration pneumonia
Aspiration pneumonitis
Aspiration pneumonitis
Severe protein-calorie malnutrition
Aspiration pneumonia
Aspiration pneumonitis
Severe protein-calorie malnutrition
Aspiration pneumonitis
Aspiration pneumonitis
Severe protein-calorie malnutrition
Aspiration pneumonitis
Aspiration pneumonitis

## 2021-02-08 NOTE — DISCHARGE NOTE PROVIDER - DETAILS OF MALNUTRITION DIAGNOSIS/DIAGNOSES
This patient has been assessed with a concern for Malnutrition and was treated during this hospitalization for the following Nutrition diagnosis/diagnoses:     -  01/28/2021: Underweight (BMI < 19)   This patient has been assessed with a concern for Malnutrition and was treated during this hospitalization for the following Nutrition diagnosis/diagnoses:     -  01/28/2021: Underweight (BMI < 19)    This patient has been assessed with a concern for Malnutrition and was treated during this hospitalization for the following Nutrition diagnosis/diagnoses:     -  01/28/2021: Underweight (BMI < 19)   This patient has been assessed with a concern for Malnutrition and was treated during this hospitalization for the following Nutrition diagnosis/diagnoses:     -  01/28/2021: Underweight (BMI < 19)    This patient has been assessed with a concern for Malnutrition and was treated during this hospitalization for the following Nutrition diagnosis/diagnoses:     -  01/28/2021: Underweight (BMI < 19)    This patient has been assessed with a concern for Malnutrition and was treated during this hospitalization for the following Nutrition diagnosis/diagnoses:     -  01/28/2021: Underweight (BMI < 19)   This patient has been assessed with a concern for Malnutrition and was treated during this hospitalization for the following Nutrition diagnosis/diagnoses:     -  01/28/2021: Underweight (BMI < 19)    This patient has been assessed with a concern for Malnutrition and was treated during this hospitalization for the following Nutrition diagnosis/diagnoses:     -  01/28/2021: Underweight (BMI < 19)    This patient has been assessed with a concern for Malnutrition and was treated during this hospitalization for the following Nutrition diagnosis/diagnoses:     -  01/28/2021: Underweight (BMI < 19)    This patient has been assessed with a concern for Malnutrition and was treated during this hospitalization for the following Nutrition diagnosis/diagnoses:     -  01/28/2021: Underweight (BMI < 19)   This patient has been assessed with a concern for Malnutrition and was treated during this hospitalization for the following Nutrition diagnosis/diagnoses:     -  01/28/2021: Underweight (BMI < 19)    This patient has been assessed with a concern for Malnutrition and was treated during this hospitalization for the following Nutrition diagnosis/diagnoses:     -  01/28/2021: Underweight (BMI < 19)    This patient has been assessed with a concern for Malnutrition and was treated during this hospitalization for the following Nutrition diagnosis/diagnoses:     -  01/28/2021: Underweight (BMI < 19)    This patient has been assessed with a concern for Malnutrition and was treated during this hospitalization for the following Nutrition diagnosis/diagnoses:     -  01/28/2021: Underweight (BMI < 19)    This patient has been assessed with a concern for Malnutrition and was treated during this hospitalization for the following Nutrition diagnosis/diagnoses:     -  01/28/2021: Underweight (BMI < 19)

## 2021-02-08 NOTE — DISCHARGE NOTE PROVIDER - HOSPITAL COURSE
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 Per aide upon arrival to ED, the patient has been groaning more frequently and points to his abdomen. Last BM was on 1/26 and no blood noted in BMs. Was disimpacted in the ED by general surgery and was found to be COVID positive. O2 sats remained good and patient never required remdesiver/ decadron or nasal cannula. He was admitted for further evaluation. During admission psychiatry was consulted for agitation and palliative care was consulted. After family meeting patient was made DNR/DNI and comfort measures only. Plan was made for discharge home with home hospice.    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 Per aide upon arrival to ED, the patient has been groaning more frequently and points to his abdomen. Last BM was on 1/26th and no blood noted in BMs. Was disimpacted in the ED. Never required oxygen. Pending negative COVID swab to be dc'ed back to nursing home. Patient has not adjusted well to being in the hospital. He is scared in the new environment, agitated, and sometimes not willing to take his seroquel. Psych consulted 2/3, recommend increase to Seroquel 100 BID and IM olazapine 2.5 q8 PRN agitation. Of note, last BM was on the day of admission, initially giving senna and miralax daily (when pt did not refuse), however discontinued on 2/5 in light of diarrhea overnight. Palliative consulted, DNR/DNI and comfort measures agreed upon w/ family. Found to be hypernatremic likely 2/2 poor PO intake, d/cheryl for comfort.   Patient was discharged with home hospice. 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 Per aide upon arrival to ED, the patient has been groaning more frequently and points to his abdomen. Last BM was on 1/26th and no blood noted in BMs. Was disimpacted in the ED. Never required oxygen. Pending negative COVID swab to be dc'ed back to nursing home. Patient has not adjusted well to being in the hospital. He is scared in the new environment, agitated, and sometimes not willing to take his seroquel. Psych consulted 2/3, recommend increase to Seroquel 100 BID and IM olazapine 2.5 q8 PRN agitation. Of note, last BM was on the day of admission, initially giving senna and miralax daily (when pt did not refuse), however discontinued on 2/5 in light of diarrhea overnight. Palliative consulted, DNR/DNI and comfort measures agreed upon w/ family. Found to be hypernatremic likely 2/2 poor PO intake, d/cheryl for comfort.   Patient was discharged with home hospice.     Problem/Plan - 1:  ·  Problem: Proctitis.  Plan: -Pt p/w with 1 week of abdominal and lower back pain. Found to have stool impaction, s/p disimpaction by surgery in the ED. No acute surgical intervention and no abx recommended by surgery at this time.  -s/p enema on 2/2 and bowel regimen of miralax and senna discontinued on 2/5 secondary to diarrhea overnight, resolved   - s/p IV CTX 1g x1, IV flagyl 500mg x1, IV vanc 1500mg x1 in the ED  - pt afebrile, no leukocytosis and no signs of active infection.      Problem/Plan - 2:  ·  Problem: Severe protein-calorie malnutrition.  Plan: -BMI 16.9. Eating very little inpatient and is not improving much. Reportedly was ambulating well at assisted living facility.  - 1/29 made comfort care, DNR/DNI. MEWS exempt  - Patient refusing to complete speech and swallow evaluation; recommend thin liquids and mechanical soft diet    #Agitation- h/o on admission   - Refusing all of Dementia, Bipolar, and schizophrenia meds ordered  - Psych recs: seroquel 50mg daily, 100 hs and avitan PRN , ordered but refuses.      Problem/Plan - 3:  ·  Problem: Pneumonia due to COVID-19 virus.  Plan: Pt incidentally found to have COVID-19 PCR+ on presentation.   2/7: 95% on RA   - DVT ppx discontinued- patient DNR/DNI  - Plan is for home with home hospice     Problem/Plan - 4:  ·  Problem: Aspiration pneumonitis.  Plan: -Found to have +Bibasilar lower lobe consolidations, subtle GGO in RML on CT chest on admission   - VSS   - Aspiration precautions  - mechanical soft/thin liquids   - no indication for abx.      Problem/Plan - 5:  ·  Problem: Bipolar 1 disorder.  Plan: Hx of bipolar and schizophrenia. Had been taking Seroquel 25mg prn outpatient.   - Psych consulted, on seroquel 50AM, 100 HS, ativan PRN   - Currently refusing psych meds.      Problem/Plan - 6:  Problem: Insomnia. Plan: Hx of insomnia, takes melatonin 3mg qhs  - c/w melatonin.     Problem/Plan - 7:  ·  Problem: Dementia.  Plan: Hx of dementia with lewy bodies. Nonverbal at baseline. Not on any other known medications for dementia  - comfort care, DNR/DNI.      Problem/Plan - 8:  ·  Problem: Constipation.  Plan: bowel regimen d/c'd 2/2 diarrhea.       ATTENDING ATTESTATION  Date of Service: 2/9/21    I interviewed and examined Arturo Angulo on the day of discharge and greater than 30 minutes were spent by the team on processing their hospital discharge and coordinating their post-hospital care.     I discussed the care plan with the resident team. I agree with the discharge plan as outlined in the Discharge Summary. I have personally reviewed the above discharge summary and made changes where necessary, and as noted below.    Also developed hypernatremia during hospital stay 2/2 poor oral intake, though no further intervention given comfort-focused care. Stable for discharge home with home hospice today.     Physical exam on day of discharge:  General: awake, not alert though occasionally tracking interviewer. AAOx0  HEENT: NC/AT, dry mucus membranes  Neck: soft, supple, no lymphadenopathy  Cardiac: regular rhythm, normal rate, normal s1/s2, no murmurs, rubs, or gallops  Lungs: clear to auscultation bilaterally without wheezes, rales, or rhonchi. Normal work of breathing. On room air  Abdomen: soft, nontender, nondistended. Bowel sounds present and normoactive.   Extremities: moving all extremities. No edema.  Neuro: awake, not alert, oriented x0.    Edward Thompson MD  Attending Hospitalist

## 2021-02-08 NOTE — PROGRESS NOTE ADULT - SUBJECTIVE AND OBJECTIVE BOX
SUBJECTIVE: Due to public health emergency during pandemic and requirements to limit consultant interaction, please refer to primary team note for physical exam.     per chart review, pt non participatory in exam, unable to provide ROS, appears comfortable.     DNR in chart: Yes    PEx:  T(C): 36.7 (02-08-21 @ 04:02), Max: 36.7 (02-07-21 @ 21:00)  HR: 90 (02-08-21 @ 04:02) (70 - 90)  BP: 129/82 (02-08-21 @ 04:02) (128/83 - 129/82)  RR: 19 (02-08-21 @ 04:02) (19 - 20)  SpO2: 95% (02-08-21 @ 04:02) (95% - 95%)  Wt(kg): --     ALLERGIES: No Known Allergies    OPIATE NAÏVE (Y/N): Y    MEDICATIONS: REVIEWED  MEDICATIONS  (STANDING):  melatonin 3 milliGRAM(s) Oral at bedtime  QUEtiapine 50 milliGRAM(s) Oral daily  QUEtiapine 100 milliGRAM(s) Oral at bedtime    MEDICATIONS  (PRN):  LORazepam     Tablet 1 milliGRAM(s) Oral every 8 hours PRN Agitation    LABS: REVIEWED  Albumin, Serum: 3.4 g/dL (02-04-21 @ 20:32)    IMAGING: REVIEWED    ADVANCE DIRECTIVES  -DNR/DNI    Decision maker: The patient is UNable to participate in complex medical decision making conversations.   Legal surrogate: wife, Ann, #[phone] or #[phone]    GOALS OF CARE DISCUSSION  -Comfort care, no interventions that cause the patient distress or prolong suffering   -dc [facility] with private pay hired help

## 2021-02-08 NOTE — PROGRESS NOTE ADULT - ASSESSMENT
81 yo M PMHx LB dementia with behaviors, bipolar disorder, schizophrenia, PSHx of prostatectomy, who presented 1/27 from [address] [place] [address] to ED with abdominal pain, found to have stool burden sp disimpaction, probable stercoral proctitis, COVID+  . Significant functional decline this admission. Palliative Care following for supportive care and discharge with home hospice.

## 2021-02-08 NOTE — PROGRESS NOTE ADULT - NUTRITIONAL ASSESSMENT
This patient has been assessed with a concern for Malnutrition and has been determined to have a diagnosis/diagnoses of Underweight (BMI < 19).    This patient is being managed with:   Diet Dysphagia 2 Mechanical Soft-Thin Liquids-  Supplement Feeding Modality:  Oral  Ensure Enlive Servings Per Day:  1       Frequency:  Two Times a day  Entered: Jan 28 2021  3:31PM    

## 2021-02-08 NOTE — PROGRESS NOTE ADULT - PROBLEM SELECTOR PLAN 3
Pt incidentally found to have COVID-19 PCR+ on presentation.   2/7: 95% on RA   - DVT ppx discontinued- patient DNR/DNI  - Plan is for home with home hospice if negative swab   -COVID swabbed 2/, pending result

## 2021-02-08 NOTE — PROGRESS NOTE ADULT - PROBLEM SELECTOR PROBLEM 8
Constipation
Encounter for palliative care
Constipation
Encounter for palliative care
Constipation
Constipation
DNR (do not resuscitate)
Constipation
Constipation
Encounter for palliative care
DNR (do not resuscitate)
Constipation
DNR (do not resuscitate)
Constipation
Constipation

## 2021-02-08 NOTE — PROGRESS NOTE ADULT - PROBLEM SELECTOR PLAN 5
Hx of bipolar and schizophrenia. Had been taking Seroquel 25mg prn outpatient.   - Psych consulted, on seroquel 50AM, 100 HS, ativan PRN   - Currently refusing psych meds

## 2021-02-08 NOTE — PROGRESS NOTE ADULT - PROBLEM SELECTOR PLAN 4
-Found to have +Bibasilar lower lobe consolidations, subtle GGO in RML on CT chest on admission   - VSS   - Aspiration precautions  - mechanical soft/thin liquids   - no indication for abx
-bilateral LL consolidations cf aspiration pna on chest CT 1/27  -ss recs as above  -no antibiotics if administration and work up causes pt distress, pt is comfort care
Pt found to have +Bibasilar lower lobe consolidations, subtle GGO in RML on CT chest. Pt afebrile, no leukocytosis and not septic  - Aspiration precautions  - Hold off on antibiotics at this time
pt refusing fluids, PO, feeds from staff  -albumin 3.4, bun 31 up from 29, na 157 up from 150  -food for comfort as tolerated by patient   -appreciate ss recs: mechanical soft thin liquids  -agitation management as above
-Found to have +Bibasilar lower lobe consolidations, subtle GGO in RML on CT chest. Pt afebrile, no leukocytosis and not septic  - Aspiration precautions  - Hold off on antibiotics at this time
-bilateral LL consolidations cf aspiration pna on chest CT 1/27  -silent aspiration   -per correction WD, no recent hx of coughing with PO, aspirating  -ss recs as above
Pt found to have +Bibasilar lower lobe consolidations, subtle GGO in RML on CT chest. Pt afebrile, no leukocytosis and not septic  - Aspiration precautions  - Hold off on antibiotics at this time
pt refusing fluids, PO, feeds from staff  -food for comfort as tolerated by patient   -appreciate ss recs: mechanical soft thin liquids  -agitation management as above
-Found to have +Bibasilar lower lobe consolidations, subtle GGO in RML on CT chest on admission   - VSS   - Aspiration precautions  - mechanical soft/thin liquids   - no indication for abx
Pt found to have +Bibasilar lower lobe consolidations, subtle GGO in RML on CT chest. Pt afebrile, no leukocytosis and not septic  - Aspiration precautions  - Hold off on antibiotics at this time
-bilateral LL consolidations cf aspiration pna on chest CT 1/27  -ss recs as above  -no antibiotics if administration and work up causes pt distress, pt is comfort care
Pt found to have +Bibasilar lower lobe consolidations, subtle GGO in RML on CT chest. Pt afebrile, no leukocytosis and not septic  - Aspiration precautions  - Hold off on antibiotics at this time
-Found to have +Bibasilar lower lobe consolidations, subtle GGO in RML on CT chest. Pt afebrile, no leukocytosis and not septic  - Aspiration precautions  - Hold off on antibiotics at this time
pt refusing PO, feeds. bmi 16.9, wt 51.8. alb 3.2  -food for comfort as tolerated by patient   -appreciate ss recs: mechanical soft thin liquids  -agitation management as above
Pt found to have +Bibasilar lower lobe consolidations, subtle GGO in RML on CT chest. Pt afebrile, no leukocytosis and not septic  - Aspiration precautions  - Hold off on antibiotics at this time

## 2021-02-08 NOTE — PROGRESS NOTE ADULT - PROVIDER SPECIALTY LIST ADULT
Internal Medicine
Hospitalist
Palliative Care
Internal Medicine
Internal Medicine
Palliative Care
Internal Medicine
Palliative Care
Palliative Care
Internal Medicine
Internal Medicine
Palliative Care
Palliative Care

## 2021-02-08 NOTE — DISCHARGE NOTE PROVIDER - NSDCMRMEDTOKEN_GEN_ALL_CORE_FT
Melatonin 3 mg oral tablet: 1 tab(s) orally once a day (at bedtime)  SEROquel 25 mg oral tablet: 1 tab(s) orally once a day, As Needed   melatonin 3 mg oral tablet: 1 tab(s) orally once a day (at bedtime)  QUEtiapine 100 mg oral tablet: 1 tab(s) orally once a day (at bedtime)  QUEtiapine 50 mg oral tablet: 1 tab(s) orally once a day

## 2021-02-08 NOTE — PROGRESS NOTE ADULT - PROBLEM SELECTOR PLAN 9
-geriatric M with significant decline in functional status this hospitalization, not eating, bedbound, total care, now hospice appropriate   -discharge tomorrow 12:30 pm with home hospice as above  -discussions with wife, Ann. all questions answered. emotional support provided.   -will continue to follow with you

## 2021-02-08 NOTE — PROGRESS NOTE ADULT - PROBLEM SELECTOR PROBLEM 3
Severe protein-calorie malnutrition
Pneumonia due to COVID-19 virus
Pneumonia due to COVID-19 virus
Lewy body dementia
Pneumonia due to COVID-19 virus
Pneumonia due to COVID-19 virus
Severe protein-calorie malnutrition
Lewy body dementia
Pneumonia due to COVID-19 virus
Pneumonia due to COVID-19 virus
Severe protein-calorie malnutrition
Pneumonia due to COVID-19 virus
Pneumonia due to COVID-19 virus
Lewy body dementia
Pneumonia due to COVID-19 virus

## 2021-02-08 NOTE — PROGRESS NOTE ADULT - PROBLEM SELECTOR PROBLEM 9
Nutrition, metabolism, and development symptoms
Encounter for palliative care
Nutrition, metabolism, and development symptoms
Encounter for palliative care
Nutrition, metabolism, and development symptoms
Nutrition, metabolism, and development symptoms
Encounter for palliative care
Nutrition, metabolism, and development symptoms

## 2021-02-08 NOTE — PROGRESS NOTE ADULT - ASSESSMENT
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 Also found to be COVID+.   Currently on comfort care after discussion between family and Palliative. Awaiting negative swabs for discharge home with home hospice.

## 2021-02-08 NOTE — PROGRESS NOTE ADULT - PROBLEM SELECTOR PLAN 2
hx LB dementia. improved, controlled. likely exacerbated by social isolation, unfamiliar environment, hyperactive delirium, confusion when interacting with staff wearing PPE, approaching EOL  -sp psych consult  -seroquel increased from 25mg PO BID, to 50 mg BID, to 100 mg BID in 24 hr period 2/1, wife concerned for rapid titration   -cw seroquel 50 mg PO qAM and 100mg PO qHS if pt able to safely swallow  -ativan 1 mg PO q8 PRN agitation   -recommend administering ativan 1 mg prior to AM care and brief changes  -no haldol, neuroleptic malignant syndrome

## 2021-02-08 NOTE — PROGRESS NOTE ADULT - PROBLEM SELECTOR PLAN 5
-bilateral LL consolidations cf aspiration pna on chest CT 1/27  -aspiration precautions   -ss recs as above  -no antibiotics if administration and work up causes pt distress

## 2021-02-08 NOTE — PROGRESS NOTE ADULT - PROBLEM SELECTOR PLAN 2
-BMI 16.9. Eating very little inpatient and is not improving much. Reportedly was ambulating well at assisted living facility.  - 1/29 made comfort care, DNR/DNI. MEWS exempt  - Patient refusing to complete speech and swallow evaluation; recommend thin liquids and mechanical soft diet    #Agitation- h/o on admission   - Refusing all of Dementia, Bipolar, and schizophrenia meds ordered  - Psych recs: seroquel 50mg daily, 100 hs and avitan PRN , ordered but refuses

## 2021-02-08 NOTE — PROGRESS NOTE ADULT - PROBLEM SELECTOR PLAN 3
nonverbal with behaviors prior to admission.   -this admission pt w significant decline in functional status, not eating, bedbound, total care, now hospice appropriate   -comfort care  -dc home hospice

## 2021-02-08 NOTE — DISCHARGE NOTE PROVIDER - NSDCCPCAREPLAN_GEN_ALL_CORE_FT
PRINCIPAL DISCHARGE DIAGNOSIS  Diagnosis: Constipation  Assessment and Plan of Treatment:       SECONDARY DISCHARGE DIAGNOSES  Diagnosis: Aspiration pneumonia  Assessment and Plan of Treatment:     Diagnosis: COVID-19  Assessment and Plan of Treatment:     Diagnosis: Proctitis  Assessment and Plan of Treatment:      PRINCIPAL DISCHARGE DIAGNOSIS  Diagnosis: Proctitis  Assessment and Plan of Treatment: You presented with abdominal pain and severe constipation. You were found to have inflammation in your bowel due to the stool burden and were disimpacted by the general surgery service. You were given laxitives to prevent this from recurring.      SECONDARY DISCHARGE DIAGNOSES  Diagnosis: COVID-19  Assessment and Plan of Treatment: You tested positive for COVID-19 infection but did not require any supplemental oxygen or advanced medical interventions. You remain positive when tested prior to discharge and should remain isolated from others for 10 days to prevent spread.    Diagnosis: Encounter for palliative care  Assessment and Plan of Treatment: Given your decline in functional status, you were seen by the palliative care team who in consultation with your family decided to continue with aggressive care. You are being discharged to home with home hospice.

## 2021-02-08 NOTE — PROGRESS NOTE ADULT - SUBJECTIVE AND OBJECTIVE BOX
Medicine progress note:    Patient discussed on morning rounds with Dr. 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 Per aide upon arrival to ED, the patient has been groaning more frequently and points to his abdomen. Last BM was on 1/26 and no blood noted in BMs. Was disimpacted in the ED by general surgery and was found to be COVID positive. O2 sats remained good and patient never required remdesiver/ decadron or nasal cannula. He was admitted for further evaluation. During admission psychiatry was consulted for agitation and palliative care was consulted. After family meeting patient was made DNR/DNI and comfort measures only. Plan was made for discharge home with home hospice pending negative COVID swab.     SUBJECTIVE ASSESSMENT:  80y Male, non-participatory in exam.     Vital Signs Last 24 Hrs  T(C): 36.7 (08 Feb 2021 04:02), Max: 36.7 (07 Feb 2021 21:00)  T(F): 98 (08 Feb 2021 04:02), Max: 98 (07 Feb 2021 21:00)  HR: 90 (08 Feb 2021 04:02) (70 - 90)  BP: 129/82 (08 Feb 2021 04:02) (128/83 - 129/82)  BP(mean): --  RR: 19 (08 Feb 2021 04:02) (19 - 20)  SpO2: 95% (08 Feb 2021 04:02) (95% - 95%)  I&O's Detail    07 Feb 2021 07:01  -  08 Feb 2021 07:00  --------------------------------------------------------  IN:    Oral Fluid: 60 mL  Total IN: 60 mL    OUT:  Total OUT: 0 mL    Total NET: 60 mL      Physical Exam  CONSTITUTIONAL: NAD, frail elderly male laying in bed.   NEURO: A&OX0, does not answer questions appropriately, moving all 4 extremities                CV: RRR  RESPIRATORY: Breathing comfortably on RA   GI: NT/ND  MUSKULOSKELETAL: No peripheral    VASCULAR: Warm and well perfused   SKIN: No rashes noted     LABS:    MEDICATIONS  (STANDING):  melatonin 3 milliGRAM(s) Oral at bedtime  QUEtiapine 50 milliGRAM(s) Oral daily  QUEtiapine 100 milliGRAM(s) Oral at bedtime    MEDICATIONS  (PRN):  LORazepam     Tablet 1 milliGRAM(s) Oral every 8 hours PRN Agitation    RADIOLOGY & ADDITIONAL TESTS:

## 2021-02-08 NOTE — PROGRESS NOTE ADULT - PROBLEM SELECTOR PROBLEM 5
Aspiration pneumonia
Pneumonia due to COVID-19 virus
Bipolar 1 disorder
Aspiration pneumonia
Pneumonia due to COVID-19 virus
Aspiration pneumonia
Bipolar 1 disorder
Pneumonia due to COVID-19 virus
Bipolar 1 disorder

## 2021-02-08 NOTE — PROGRESS NOTE ADULT - PROBLEM SELECTOR PROBLEM 6
Insomnia
Pneumonia due to COVID-19 virus
Insomnia
Goals of care, counseling/discussion
Insomnia
Pneumonia due to COVID-19 virus
Insomnia
Goals of care, counseling/discussion
Goals of care, counseling/discussion
Pneumonia due to COVID-19 virus
Insomnia

## 2021-02-08 NOTE — PROGRESS NOTE ADULT - PROBLEM SELECTOR PROBLEM 7
Dementia
DNR (do not resuscitate)
Dementia
DNR (do not resuscitate)
DNR (do not resuscitate)
Dementia
Goals of care, counseling/discussion
Dementia
Dementia
Goals of care, counseling/discussion
Dementia
Goals of care, counseling/discussion
Dementia

## 2021-02-08 NOTE — PROGRESS NOTE ADULT - PROBLEM SELECTOR PLAN 1
controlled. CTAP on admission cw large rectal stool burden, stercoral proctitis. sp disimpaction. Fecal incontinence 2/8  -hold BR in setting of loose stool

## 2021-02-09 VITALS
OXYGEN SATURATION: 96 % | TEMPERATURE: 97 F | HEART RATE: 104 BPM | DIASTOLIC BLOOD PRESSURE: 91 MMHG | SYSTOLIC BLOOD PRESSURE: 146 MMHG | RESPIRATION RATE: 18 BRPM

## 2021-02-09 PROCEDURE — 86140 C-REACTIVE PROTEIN: CPT

## 2021-02-09 PROCEDURE — 36415 COLL VENOUS BLD VENIPUNCTURE: CPT

## 2021-02-09 PROCEDURE — 83735 ASSAY OF MAGNESIUM: CPT

## 2021-02-09 PROCEDURE — 99285 EMERGENCY DEPT VISIT HI MDM: CPT | Mod: 25

## 2021-02-09 PROCEDURE — 97161 PT EVAL LOW COMPLEX 20 MIN: CPT

## 2021-02-09 PROCEDURE — 71045 X-RAY EXAM CHEST 1 VIEW: CPT

## 2021-02-09 PROCEDURE — 85379 FIBRIN DEGRADATION QUANT: CPT

## 2021-02-09 PROCEDURE — 96375 TX/PRO/DX INJ NEW DRUG ADDON: CPT

## 2021-02-09 PROCEDURE — 81001 URINALYSIS AUTO W/SCOPE: CPT

## 2021-02-09 PROCEDURE — 82728 ASSAY OF FERRITIN: CPT

## 2021-02-09 PROCEDURE — U0005: CPT

## 2021-02-09 PROCEDURE — 85025 COMPLETE CBC W/AUTO DIFF WBC: CPT

## 2021-02-09 PROCEDURE — 96374 THER/PROPH/DIAG INJ IV PUSH: CPT | Mod: XU

## 2021-02-09 PROCEDURE — 71260 CT THORAX DX C+: CPT

## 2021-02-09 PROCEDURE — 74177 CT ABD & PELVIS W/CONTRAST: CPT

## 2021-02-09 PROCEDURE — 84100 ASSAY OF PHOSPHORUS: CPT

## 2021-02-09 PROCEDURE — 70450 CT HEAD/BRAIN W/O DYE: CPT

## 2021-02-09 PROCEDURE — 92610 EVALUATE SWALLOWING FUNCTION: CPT

## 2021-02-09 PROCEDURE — U0003: CPT

## 2021-02-09 PROCEDURE — 80053 COMPREHEN METABOLIC PANEL: CPT

## 2021-02-09 PROCEDURE — 99239 HOSP IP/OBS DSCHRG MGMT >30: CPT | Mod: CS

## 2021-02-09 PROCEDURE — 85027 COMPLETE CBC AUTOMATED: CPT

## 2021-02-09 RX ORDER — LANOLIN ALCOHOL/MO/W.PET/CERES
1 CREAM (GRAM) TOPICAL
Qty: 0 | Refills: 0 | DISCHARGE
Start: 2021-02-09

## 2021-02-09 RX ORDER — QUETIAPINE FUMARATE 200 MG/1
1 TABLET, FILM COATED ORAL
Qty: 0 | Refills: 0 | DISCHARGE
Start: 2021-02-09

## 2021-02-09 RX ADMIN — QUETIAPINE FUMARATE 50 MILLIGRAM(S): 200 TABLET, FILM COATED ORAL at 12:00

## 2021-02-13 DIAGNOSIS — G47.00 INSOMNIA, UNSPECIFIED: ICD-10-CM

## 2021-02-13 DIAGNOSIS — Z53.20 PROCEDURE AND TREATMENT NOT CARRIED OUT BECAUSE OF PATIENT'S DECISION FOR UNSPECIFIED REASONS: ICD-10-CM

## 2021-02-13 DIAGNOSIS — K56.41 FECAL IMPACTION: ICD-10-CM

## 2021-02-13 DIAGNOSIS — R63.6 UNDERWEIGHT: ICD-10-CM

## 2021-02-13 DIAGNOSIS — E43 UNSPECIFIED SEVERE PROTEIN-CALORIE MALNUTRITION: ICD-10-CM

## 2021-02-13 DIAGNOSIS — F31.9 BIPOLAR DISORDER, UNSPECIFIED: ICD-10-CM

## 2021-02-13 DIAGNOSIS — G31.83 NEUROCOGNITIVE DISORDER WITH LEWY BODIES: ICD-10-CM

## 2021-02-13 DIAGNOSIS — R19.7 DIARRHEA, UNSPECIFIED: ICD-10-CM

## 2021-02-13 DIAGNOSIS — J12.82 PNEUMONIA DUE TO CORONAVIRUS DISEASE 2019: ICD-10-CM

## 2021-02-13 DIAGNOSIS — Z66 DO NOT RESUSCITATE: ICD-10-CM

## 2021-02-13 DIAGNOSIS — F02.81 DEMENTIA IN OTHER DISEASES CLASSIFIED ELSEWHERE, UNSPECIFIED SEVERITY, WITH BEHAVIORAL DISTURBANCE: ICD-10-CM

## 2021-02-13 DIAGNOSIS — Z90.79 ACQUIRED ABSENCE OF OTHER GENITAL ORGAN(S): ICD-10-CM

## 2021-02-13 DIAGNOSIS — J69.0 PNEUMONITIS DUE TO INHALATION OF FOOD AND VOMIT: ICD-10-CM

## 2021-02-13 DIAGNOSIS — U07.1 COVID-19: ICD-10-CM

## 2021-02-13 DIAGNOSIS — E87.0 HYPEROSMOLALITY AND HYPERNATREMIA: ICD-10-CM

## 2021-02-13 DIAGNOSIS — K62.89 OTHER SPECIFIED DISEASES OF ANUS AND RECTUM: ICD-10-CM

## 2021-02-13 DIAGNOSIS — Z51.5 ENCOUNTER FOR PALLIATIVE CARE: ICD-10-CM

## 2021-02-13 DIAGNOSIS — Z74.01 BED CONFINEMENT STATUS: ICD-10-CM

## 2021-02-13 DIAGNOSIS — F20.9 SCHIZOPHRENIA, UNSPECIFIED: ICD-10-CM

## 2021-02-13 DIAGNOSIS — G30.9 ALZHEIMER'S DISEASE, UNSPECIFIED: ICD-10-CM

## 2023-08-15 NOTE — PROGRESS NOTE ADULT - PROBLEM SELECTOR PLAN 9
Plan:   F: none  E: replete K<4, Mg<2  N: Regular diet  VTE Prophylaxis: lovenox  C: DNR/DNI  D: RMF Methotrexate Pregnancy And Lactation Text: This medication is Pregnancy Category X and is known to cause fetal harm. This medication is excreted in breast milk.

## 2025-01-29 NOTE — PROGRESS NOTE ADULT - ATTENDING COMMENTS
Pt made comfort care by HCP today as per pall care, will need to swab negative to go back to home facility
C/w bowel regimen, pending repeat SWAB today to help with placement
Continuing with comfort care measures. Will swab again today as part of placement process. Continuing with seroquel for bipolar disorder and melatonin for insomnia.    Rest of note as above.
1
Principal Discharge DX:	Atypical pneumonia  
Pt comfort care, pending home hospice placement next week, will d/w Epidemiology transferring patient to non covid floor
BMI < 19 Underweight as per nutrition recs
Pt seen and examined at bedside - continues to have very poor PO intake, will continue to encourage PO intake; palliative following, will reswab pt again today
